# Patient Record
Sex: FEMALE | Race: WHITE | NOT HISPANIC OR LATINO | Employment: UNEMPLOYED | URBAN - METROPOLITAN AREA
[De-identification: names, ages, dates, MRNs, and addresses within clinical notes are randomized per-mention and may not be internally consistent; named-entity substitution may affect disease eponyms.]

---

## 2017-11-13 ENCOUNTER — HOSPITAL ENCOUNTER (OUTPATIENT)
Facility: HOSPITAL | Age: 22
Discharge: HOME | End: 2017-11-13
Attending: OBSTETRICS & GYNECOLOGY | Admitting: OBSTETRICS & GYNECOLOGY
Payer: COMMERCIAL

## 2017-11-13 VITALS
WEIGHT: 175 LBS | HEART RATE: 100 BPM | SYSTOLIC BLOOD PRESSURE: 129 MMHG | OXYGEN SATURATION: 100 % | RESPIRATION RATE: 20 BRPM | HEIGHT: 66 IN | DIASTOLIC BLOOD PRESSURE: 77 MMHG | TEMPERATURE: 98.1 F | BODY MASS INDEX: 28.12 KG/M2

## 2017-11-13 DIAGNOSIS — Z3A.39 39 WEEKS GESTATION OF PREGNANCY: ICD-10-CM

## 2017-11-13 PROCEDURE — G0463 HOSPITAL OUTPT CLINIC VISIT: HCPCS

## 2017-11-13 PROCEDURE — 99204 OFFICE O/P NEW MOD 45 MIN: CPT

## 2017-11-13 NOTE — PROGRESS NOTES
L&D Triage Note - OB/GYN  Nathalie Anderson 19 y o  female MRN: [id number]  Unit/Bed#: LD Triage  Encounter: [id number]      Assessment:  19 y o   at 31wga, not ruptured  Physiologic leukorrhea of pregnancy    Plan:  1  Signs and symptoms of labor reviewed with patient with precautions to return  Pt demonstrated understanding  2  Pt is planning on switching to Dr Astrid Fernando' OB/GYN practice  Contact information given to patient  Recommended calling first thing tomorrow to establish care  3  Discharge to home    Discussed with Dr Long Nguyễn      ______________________________________________________________________      Chief Compliant: I think I broke my water    TIME: 1530  Subjective:  19 y o  Edna Guadarrama at [address] presents to triage with concern for ruptured membranes  Pt reports that she thinks she lost her mucous plug on Friday  She then sneezed at work today and felt wet  She reports that the fluid was green, and is now looking clear  The leaking is not continuous  Endorses good fetal movement  Denies VB, contractions  Pt reports this pregnancy has been uncomplicated  She has had a previous  section x1 in 2016  She is Rh negative  Objective:  Vitals:    17 1520   BP: 129/77   Pulse: 100   Resp: 20   Temp: 98 1 °F (36 7 °C)   SpO2:        SVE: Not indicated  FHT:  Reactive and reassuring  [place]: Quiet  SSE: Copious amounts of thick whitish discharge apparent within the vaginal vault  Cervix is visually closed  No blood appreciated  Negative pooling, ferning, nitrazine  [place]/KOH: Negative for clue cells, trichomonads, hyphae  TAUS: Q1 2 40, Q2 3 09, Q3 0 82, Q4 2 33, total ELYSE 8 64  Good fetal movement appreciated   Vertex presentation          Jacqueline Starr DO 2017 3:51 PM

## 2017-11-20 ENCOUNTER — ANESTHESIA EVENT (INPATIENT)
Dept: LABOR AND DELIVERY | Facility: HOSPITAL | Age: 22
End: 2017-11-20
Payer: COMMERCIAL

## 2017-11-20 ENCOUNTER — HOSPITAL ENCOUNTER (INPATIENT)
Facility: HOSPITAL | Age: 22
LOS: 2 days | Discharge: HOME | End: 2017-11-22
Attending: OBSTETRICS & GYNECOLOGY | Admitting: OBSTETRICS & GYNECOLOGY
Payer: COMMERCIAL

## 2017-11-20 ENCOUNTER — ANESTHESIA (INPATIENT)
Dept: LABOR AND DELIVERY | Facility: HOSPITAL | Age: 22
End: 2017-11-20
Payer: COMMERCIAL

## 2017-11-20 DIAGNOSIS — Z3A.32 32 WEEKS GESTATION OF PREGNANCY: Primary | ICD-10-CM

## 2017-11-20 DIAGNOSIS — O60.00 PRETERM LABOR: ICD-10-CM

## 2017-11-20 DIAGNOSIS — Z98.891 HISTORY OF CESAREAN SECTION: ICD-10-CM

## 2017-11-20 DIAGNOSIS — Z67.91 RH NEGATIVE STATUS DURING PREGNANCY: ICD-10-CM

## 2017-11-20 DIAGNOSIS — O09.30 INSUFFICIENT PRENATAL CARE: ICD-10-CM

## 2017-11-20 DIAGNOSIS — O26.899 RH NEGATIVE STATUS DURING PREGNANCY: ICD-10-CM

## 2017-11-20 LAB
ABO GROUP BLD: NORMAL
AMPHETAMINES SERPL QL SCN: NEGATIVE
BACTERIA UR QL AUTO: ABNORMAL /HPF
BARBITURATES UR QL: NEGATIVE
BASOPHILS # BLD AUTO: 0.01 THOUSANDS/ΜL (ref 0–0.1)
BASOPHILS NFR BLD AUTO: 0 % (ref 0–1)
BENZODIAZ UR QL: NEGATIVE
BILIRUB UR QL STRIP: ABNORMAL
BLD GP AB SCN SERPL QL: NEGATIVE
CLARITY UR: ABNORMAL
COCAINE UR QL: NEGATIVE
COLOR UR: ABNORMAL
EOSINOPHIL # BLD AUTO: 0.02 THOUSAND/ΜL (ref 0–0.61)
EOSINOPHIL NFR BLD AUTO: 0 % (ref 0–6)
ERYTHROCYTE [DISTWIDTH] IN BLOOD BY AUTOMATED COUNT: 14.5 % (ref 11.6–15.1)
GLUCOSE UR STRIP-MCNC: NEGATIVE MG/DL
HCT VFR BLD AUTO: 32.8 % (ref 34.8–46.1)
HGB BLD-MCNC: 10.6 G/DL (ref 11.5–15.4)
HGB UR QL STRIP.AUTO: ABNORMAL
HIV 1+2 AB+HIV1 P24 AG SERPL QL IA: NORMAL
HIV1 P24 AG SER QL: NORMAL
KETONES UR STRIP-MCNC: ABNORMAL MG/DL
LEUKOCYTE ESTERASE UR QL STRIP: ABNORMAL
LYMPHOCYTES # BLD AUTO: 1.48 THOUSANDS/ΜL (ref 0.6–4.47)
LYMPHOCYTES NFR BLD AUTO: 8 % (ref 14–44)
MCH RBC QN AUTO: 26.1 PG (ref 26.8–34.3)
MCHC RBC AUTO-ENTMCNC: 32.3 G/DL (ref 31.4–37.4)
MCV RBC AUTO: 81 FL (ref 82–98)
METHADONE UR QL: NEGATIVE
MONOCYTES # BLD AUTO: 1.04 THOUSAND/ΜL (ref 0.17–1.22)
MONOCYTES NFR BLD AUTO: 6 % (ref 4–12)
NEUTROPHILS # BLD AUTO: 16.32 THOUSANDS/ΜL (ref 1.85–7.62)
NEUTS SEG NFR BLD AUTO: 86 % (ref 43–75)
NITRITE UR QL STRIP: POSITIVE
NON-SQ EPI CELLS URNS QL MICRO: ABNORMAL /HPF
NRBC BLD AUTO-RTO: 0 /100 WBCS
OPIATES UR QL SCN: NEGATIVE
PCP UR QL: NEGATIVE
PH UR STRIP.AUTO: 7 [PH] (ref 4.5–8)
PLATELET # BLD AUTO: 268 THOUSANDS/UL (ref 149–390)
PMV BLD AUTO: 9.8 FL (ref 8.9–12.7)
PROT UR STRIP-MCNC: ABNORMAL MG/DL
RBC # BLD AUTO: 4.06 MILLION/UL (ref 3.81–5.12)
RBC #/AREA URNS AUTO: ABNORMAL /HPF
RH BLD: NEGATIVE
SP GR UR STRIP.AUTO: 1.02 (ref 1–1.03)
SPECIMEN EXPIRATION DATE: NORMAL
THC UR QL: NEGATIVE
UROBILINOGEN UR QL STRIP.AUTO: 1 E.U./DL
WBC # BLD AUTO: 18.95 THOUSAND/UL (ref 4.31–10.16)
WBC #/AREA URNS AUTO: ABNORMAL /HPF

## 2017-11-20 PROCEDURE — 80307 DRUG TEST PRSMV CHEM ANLYZR: CPT | Performed by: OBSTETRICS & GYNECOLOGY

## 2017-11-20 PROCEDURE — 87653 STREP B DNA AMP PROBE: CPT | Performed by: OBSTETRICS & GYNECOLOGY

## 2017-11-20 PROCEDURE — 87806 HIV AG W/HIV1&2 ANTB W/OPTIC: CPT | Performed by: OBSTETRICS & GYNECOLOGY

## 2017-11-20 PROCEDURE — 99213 OFFICE O/P EST LOW 20 MIN: CPT

## 2017-11-20 PROCEDURE — 80081 OBSTETRIC PANEL INC HIV TSTG: CPT | Performed by: OBSTETRICS & GYNECOLOGY

## 2017-11-20 PROCEDURE — 81001 URINALYSIS AUTO W/SCOPE: CPT | Performed by: OBSTETRICS & GYNECOLOGY

## 2017-11-20 PROCEDURE — 87086 URINE CULTURE/COLONY COUNT: CPT | Performed by: OBSTETRICS & GYNECOLOGY

## 2017-11-20 PROCEDURE — 87491 CHLMYD TRACH DNA AMP PROBE: CPT | Performed by: OBSTETRICS & GYNECOLOGY

## 2017-11-20 PROCEDURE — G0463 HOSPITAL OUTPT CLINIC VISIT: HCPCS

## 2017-11-20 PROCEDURE — 87591 N.GONORRHOEAE DNA AMP PROB: CPT | Performed by: OBSTETRICS & GYNECOLOGY

## 2017-11-20 RX ORDER — SODIUM CHLORIDE, SODIUM LACTATE, POTASSIUM CHLORIDE, CALCIUM CHLORIDE 600; 310; 30; 20 MG/100ML; MG/100ML; MG/100ML; MG/100ML
125 INJECTION, SOLUTION INTRAVENOUS CONTINUOUS
Status: DISCONTINUED | OUTPATIENT
Start: 2017-11-20 | End: 2017-11-22 | Stop reason: HOSPADM

## 2017-11-20 RX ORDER — BETAMETHASONE SODIUM PHOSPHATE AND BETAMETHASONE ACETATE 3; 3 MG/ML; MG/ML
12 INJECTION, SUSPENSION INTRA-ARTICULAR; INTRALESIONAL; INTRAMUSCULAR; SOFT TISSUE EVERY 24 HOURS
Status: DISCONTINUED | OUTPATIENT
Start: 2017-11-20 | End: 2017-11-21

## 2017-11-20 RX ORDER — NIFEDIPINE 10 MG/1
30 CAPSULE ORAL ONCE
Status: COMPLETED | OUTPATIENT
Start: 2017-11-20 | End: 2017-11-20

## 2017-11-20 RX ORDER — ACETAMINOPHEN 325 MG/1
650 TABLET ORAL EVERY 4 HOURS PRN
Status: DISCONTINUED | OUTPATIENT
Start: 2017-11-20 | End: 2017-11-21

## 2017-11-20 RX ORDER — MAGNESIUM SULFATE IN WATER 40 MG/ML
6 INJECTION, SOLUTION INTRAVENOUS ONCE
Status: COMPLETED | OUTPATIENT
Start: 2017-11-20 | End: 2017-11-20

## 2017-11-20 RX ORDER — OXYTOCIN/RINGER'S LACTATE 30/500 ML
PLASTIC BAG, INJECTION (ML) INTRAVENOUS
Status: COMPLETED
Start: 2017-11-20 | End: 2017-11-21

## 2017-11-20 RX ORDER — ROPIVACAINE HYDROCHLORIDE 2 MG/ML
INJECTION, SOLUTION EPIDURAL; INFILTRATION; PERINEURAL AS NEEDED
Status: DISCONTINUED | OUTPATIENT
Start: 2017-11-20 | End: 2017-11-21 | Stop reason: SURG

## 2017-11-20 RX ORDER — NIFEDIPINE 10 MG/1
10 CAPSULE ORAL EVERY 6 HOURS
Status: DISCONTINUED | OUTPATIENT
Start: 2017-11-21 | End: 2017-11-21

## 2017-11-20 RX ADMIN — NIFEDIPINE 30 MG: 10 CAPSULE ORAL at 19:43

## 2017-11-20 RX ADMIN — Medication 6 G: at 19:50

## 2017-11-20 RX ADMIN — ROPIVACAINE HYDROCHLORIDE: 2 INJECTION, SOLUTION EPIDURAL; INFILTRATION at 21:10

## 2017-11-20 RX ADMIN — ROPIVACAINE HYDROCHLORIDE 6 MG: 2 INJECTION, SOLUTION EPIDURAL; INFILTRATION at 21:06

## 2017-11-20 RX ADMIN — SODIUM CHLORIDE, POTASSIUM CHLORIDE, SODIUM LACTATE AND CALCIUM CHLORIDE 125 ML/HR: 600; 310; 30; 20 INJECTION, SOLUTION INTRAVENOUS at 22:48

## 2017-11-20 RX ADMIN — SODIUM CHLORIDE, POTASSIUM CHLORIDE, SODIUM LACTATE AND CALCIUM CHLORIDE 125 ML/HR: 600; 310; 30; 20 INJECTION, SOLUTION INTRAVENOUS at 20:49

## 2017-11-20 RX ADMIN — MAGNESIUM SULFATE HEPTAHYDRATE 2 G/HR: 500 INJECTION, SOLUTION INTRAMUSCULAR; INTRAVENOUS at 21:25

## 2017-11-20 RX ADMIN — BETAMETHASONE SODIUM PHOSPHATE AND BETAMETHASONE ACETATE 12 MG: 3; 3 INJECTION, SUSPENSION INTRA-ARTICULAR; INTRALESIONAL; INTRAMUSCULAR at 19:52

## 2017-11-20 RX ADMIN — SODIUM CHLORIDE 5 MILLION UNITS: 0.9 INJECTION, SOLUTION INTRAVENOUS at 19:44

## 2017-11-20 RX ADMIN — SODIUM CHLORIDE, POTASSIUM CHLORIDE, SODIUM LACTATE AND CALCIUM CHLORIDE 125 ML/HR: 600; 310; 30; 20 INJECTION, SOLUTION INTRAVENOUS at 19:30

## 2017-11-20 RX ADMIN — SODIUM CHLORIDE 2.5 MILLION UNITS: 9 INJECTION, SOLUTION INTRAVENOUS at 23:23

## 2017-11-21 LAB
BACTERIA UR CULT: NORMAL
BASE EXCESS BLDCOA CALC-SCNC: -5.8 MMOL/L (ref 3–11)
BASE EXCESS BLDCOV CALC-SCNC: -4.8 MMOL/L (ref 1–9)
EXTERNAL GROUP B STREP ANTIGEN: NORMAL
HBV SURFACE AG SER QL: NORMAL
HCO3 BLDCOA-SCNC: 21.7 MMOL/L (ref 17.3–27.3)
HCO3 BLDCOV-SCNC: 19.5 MMOL/L (ref 12.2–28.6)
O2 CT VFR BLDCOA CALC: 7.2 ML/DL
OXYHGB MFR BLDCOA: 31.5 %
OXYHGB MFR BLDCOV: 60.3 %
PCO2 BLDCOA: 49.8 MM[HG] (ref 30–60)
PCO2 BLDCOV: 34.4 MM HG (ref 27–43)
PH BLDCOA: 7.26 [PH] (ref 7.23–7.43)
PH BLDCOV: 7.37 [PH] (ref 7.19–7.49)
PO2 BLDCOA: 18.3 MM HG (ref 5–25)
PO2 BLDCOV: 27.2 MM HG (ref 15–45)
RPR SER QL: NORMAL
RUBV IGG SERPL IA-ACNC: 121.6 IU/ML
SAO2 % BLDCOV: 13.8 ML/DL

## 2017-11-21 PROCEDURE — 0UQGXZZ REPAIR VAGINA, EXTERNAL APPROACH: ICD-10-PCS | Performed by: OBSTETRICS & GYNECOLOGY

## 2017-11-21 PROCEDURE — 82805 BLOOD GASES W/O2 SATURATION: CPT | Performed by: OBSTETRICS & GYNECOLOGY

## 2017-11-21 PROCEDURE — 88307 TISSUE EXAM BY PATHOLOGIST: CPT | Performed by: OBSTETRICS & GYNECOLOGY

## 2017-11-21 PROCEDURE — 10907ZC DRAINAGE OF AMNIOTIC FLUID, THERAPEUTIC FROM PRODUCTS OF CONCEPTION, VIA NATURAL OR ARTIFICIAL OPENING: ICD-10-PCS | Performed by: OBSTETRICS & GYNECOLOGY

## 2017-11-21 RX ORDER — OXYTOCIN/RINGER'S LACTATE 30/500 ML
250 PLASTIC BAG, INJECTION (ML) INTRAVENOUS CONTINUOUS
Status: DISCONTINUED | OUTPATIENT
Start: 2017-11-21 | End: 2017-11-22 | Stop reason: HOSPADM

## 2017-11-21 RX ORDER — IBUPROFEN 600 MG/1
600 TABLET ORAL EVERY 6 HOURS PRN
Status: DISCONTINUED | OUTPATIENT
Start: 2017-11-21 | End: 2017-11-22 | Stop reason: HOSPADM

## 2017-11-21 RX ORDER — OXYCODONE HYDROCHLORIDE AND ACETAMINOPHEN 5; 325 MG/1; MG/1
1 TABLET ORAL EVERY 4 HOURS PRN
Status: DISCONTINUED | OUTPATIENT
Start: 2017-11-21 | End: 2017-11-22 | Stop reason: HOSPADM

## 2017-11-21 RX ORDER — DOCUSATE SODIUM 100 MG/1
100 CAPSULE, LIQUID FILLED ORAL 2 TIMES DAILY
Status: DISCONTINUED | OUTPATIENT
Start: 2017-11-21 | End: 2017-11-22 | Stop reason: HOSPADM

## 2017-11-21 RX ORDER — ACETAMINOPHEN 325 MG/1
650 TABLET ORAL EVERY 6 HOURS PRN
Status: DISCONTINUED | OUTPATIENT
Start: 2017-11-21 | End: 2017-11-22 | Stop reason: HOSPADM

## 2017-11-21 RX ORDER — CALCIUM CARBONATE 200(500)MG
1000 TABLET,CHEWABLE ORAL DAILY PRN
Status: DISCONTINUED | OUTPATIENT
Start: 2017-11-21 | End: 2017-11-22 | Stop reason: HOSPADM

## 2017-11-21 RX ORDER — DIPHENHYDRAMINE HCL 25 MG
25 TABLET ORAL EVERY 6 HOURS PRN
Status: DISCONTINUED | OUTPATIENT
Start: 2017-11-21 | End: 2017-11-22 | Stop reason: HOSPADM

## 2017-11-21 RX ORDER — ONDANSETRON 2 MG/ML
4 INJECTION INTRAMUSCULAR; INTRAVENOUS EVERY 8 HOURS PRN
Status: DISCONTINUED | OUTPATIENT
Start: 2017-11-21 | End: 2017-11-22 | Stop reason: HOSPADM

## 2017-11-21 RX ORDER — DIAPER,BRIEF,INFANT-TODD,DISP
1 EACH MISCELLANEOUS AS NEEDED
Status: DISCONTINUED | OUTPATIENT
Start: 2017-11-21 | End: 2017-11-22 | Stop reason: HOSPADM

## 2017-11-21 RX ORDER — OXYCODONE HYDROCHLORIDE AND ACETAMINOPHEN 5; 325 MG/1; MG/1
2 TABLET ORAL EVERY 4 HOURS PRN
Status: DISCONTINUED | OUTPATIENT
Start: 2017-11-21 | End: 2017-11-22 | Stop reason: HOSPADM

## 2017-11-21 RX ADMIN — IBUPROFEN 600 MG: 600 TABLET ORAL at 05:26

## 2017-11-21 RX ADMIN — BENZOCAINE AND MENTHOL: 20; .5 SPRAY TOPICAL at 05:27

## 2017-11-21 RX ADMIN — Medication 250 MILLI-UNITS/MIN: at 02:20

## 2017-11-21 RX ADMIN — DOCUSATE SODIUM 100 MG: 100 CAPSULE, LIQUID FILLED ORAL at 17:08

## 2017-11-21 RX ADMIN — DOCUSATE SODIUM 100 MG: 100 CAPSULE, LIQUID FILLED ORAL at 10:49

## 2017-11-21 RX ADMIN — IBUPROFEN 600 MG: 600 TABLET ORAL at 11:25

## 2017-11-21 RX ADMIN — HYDROCORTISONE 1 APPLICATION: 1 CREAM TOPICAL at 05:27

## 2017-11-21 RX ADMIN — ACETAMINOPHEN 650 MG: 325 TABLET, FILM COATED ORAL at 02:39

## 2017-11-21 RX ADMIN — WITCH HAZEL 1 PAD: 500 SOLUTION RECTAL; TOPICAL at 05:27

## 2017-11-21 RX ADMIN — IBUPROFEN 600 MG: 600 TABLET ORAL at 17:36

## 2017-11-21 RX ADMIN — Medication 1 TABLET: at 10:49

## 2017-11-21 NOTE — CONSULTS
MATERNAL CONSULTATION - NICU   Antoinette Holguin 19 y o  female MRN: [id number]  Unit/Bed#:  302-01 Encounter: [id number]    History of Present Illness     Inpatient consult to Neonatology  Consult performed by: Zahira Brandon  Consult ordered by: Nicolle Brown          HPI: Antoinette Holguin is a 19y o  year old Yves Benzilder female at [place] [address] with an CONNOR of 2018, by patients verbal report of USG performed at [facility]  who presents with  labor  She has the following prenatal labs: all prenatal labs pending    Pregnancy complications:  labor, short interval pregnancy, poor prenatal care  Fetal Complications: none  Maternal medical history and medications: none    Maternal social history: denies drug or alcohol use  Marital status: Single  Maternal  medications:  steroids: betamethsone x 1 dose on   Tocolytics:  Magnesium  Other medications: pnecillin x 1 dose on     Historical Information   Past Medical History:   Diagnosis Date    Anxiety     Depression     Varicella     vaccine      Past Surgical History:   Procedure Laterality Date     SECTION N/A 2016    Procedure:  SECTION ();   Surgeon: Lianne Guo MD;  Location: [facility];  Service:      History   Smoking Status    Former Smoker   Smokeless Tobacco    Never Used     OB History:   #: 1, Date: 16, Sex: Female, Weight: 2967 g (6 lb 8 7 oz), GA: 39w2d, Delivery: , Low Transverse, Apgar1: 9, Apgar5: 9, Living: Living, Birth Comments: previous weight entered in error    #: 2, Date: None, Sex: None, Weight: None, GA: None, Delivery: None, Apgar1: None, Apgar5: None, Living: None, Birth Comments: None    Family History: non-contributory    Meds/Allergies   all current active meds have been reviewed    Allergies   Allergen Reactions    Latex Rash       Objective   No intake or output data in the 24 hours ending 17 2300  Invasive Devices Peripheral Intravenous Line            Peripheral IV 11/20/17 Left Wrist less than 1 day          Epidural Line            Epidural Catheter 11/20/17 less than 1 day          Drain            Urethral Catheter Double-lumen;Non-latex 16 Fr  less than 1 day                Lab Results: I have personally reviewed pertinent reports  Imaging Studies: I have personally reviewed pertinent reports  EKG, Pathology, and Other Studies: I have personally reviewed pertinent reports  Code Status: Level 1 - Full Code    Counseling / Coordination of Care  Total floor / unit time spent today 40 minutes  Greater than 50% of total time was spent with the patient and / or family counseling and / or coordination of care  A description of the counseling / coordination of care: I had the pleasure of talking to [facility] about what to expect at delivery of her anticipated 26 weeks male baby  [place] labor is complicated by the fact that she had poor prenatal care which makes her dating unreliable  By OB exam and USG done today she appears to be 38 weeks  How ever , she verbally reports of an USG done at [facility] one month ago ( which was her only prenatal visit this pregnancy) which put her [address] at 01/13/18  I reassured her that a team from NICU will be present at delivery and examine the infant to determine gestational age  If her infant is premature at 32 weeks likely complications  include possible respiratory distress that he might need CPAP to provide enough FRC  Other issues include feeding immaturity that he might need gavage feeding; temperature instability that he will be in the isolette for thermoregulation; hyperbilirubinemia of prematurity with possible phototherapy, apnea of prematurity, that he might need caffeine, if severe  She plans on providing breast milk  She is aware that baby will be discharged from NICU  when he is on full PO feeding, in open crib and of course on RA   If the infant appears closer to term and appears otherwise stable we might be able to leave the infant in NBN with her for normal NB care  All her questions were answered and she understood everything            Assessment/Plan   Principal Problem:    32 weeks gestation of pregnancy  Active Problems:    History of  section    Rh negative status during pregnancy    Insufficient prenatal care    Assessment:    labor with anticipated delivery    Plan:  Await spontaneous labor

## 2017-11-21 NOTE — ANESTHESIA POSTPROCEDURE EVALUATION
Post-Op Assessment Note      CV Status:  Stable    Mental Status:  Alert and awake    Hydration Status:  Euvolemic    PONV Controlled:  None    Airway Patency:  Patent    Post Op Vitals Reviewed: Yes          Staff: Anesthesiologist     Post-op block assessment: catheter intact and no complications        BP      Temp      Pulse     Resp      SpO2

## 2017-11-21 NOTE — L&D DELIVERY NOTE
Delivery Summary - OB/GYN   Zara Morin 19 y o  female MRN: [id number]  Unit/Bed#: -01 Encounter: [id number]    Pre-delivery Diagnosis:   1  32w3d pregnancy by patient reported CONNOR   2   labor  3  History of prior  section x1  4  Insufficient prenatal care  5  GBS unknown    Post-delivery Diagnosis: same, delivered  1  Likely term gestation based on post-partum exam, delivered  2  Labor  3  History of prior  section x1  4  Insufficient prenatal care  5  GBS unknown    Attending: Danielle    Assistant(s): Daniele Bustos    Procedure:     Anesthesia: epidural    Estimated Blood Loss:  500 mL    Specimens:   1  Arterial and venous cord gases  2  Cord blood  3  Segment of umbilical cord  4  Placenta to pathology     Complications:  None apparent    Findings:  1  Viable, term female  delivered on 17 at 0213 weighing 7lbs 12oz; Apgar scores of 8 at one minute and 9 at five minutes  Umbilical artery pH 0 910 (base excess -5 8)  2  Nuchal cord x1, easily reduced  3  Spontaneous delivery of placenta with centrally inserted 3-vessel cord  4  Vaginal laceration repaired with 3-0 Vicryl rapide    Disposition: Patient tolerated the procedure well and was recovering in labor and delivery room with family and  before being transferred to the post-partum floor  Procedure Details     Description of procedure    After pushing for 54 minutes, on 17 at 0213 patient delivered a viable female , weighing 7lbs 12oz, Apgars of 8 (1 min) and 9 (5 min)  The fetal vertex delivered spontaneously  There was a single loop of nuchal cord which was easily reduced  The anterior shoulder delivered atraumatically with maternal expulsive forces and the assistance of gentle downward traction  The posterior shoulder delivered with maternal expulsive forces and the assistance of gentle upward traction  The remainder of the fetus delivered spontaneously       Upon delivery, the infant was placed on the mothers abdomen and the cord was clamped and cut  The infant was noted to cry spontaneously and was moving all extremities appropriately  There was no evidence for injury  Awaiting NICU staff evaluated the  in the infant warmer  Arterial and venous cord blood gases and cord blood was collected for analysis  These were promptly sent to the lab  In the immediate post-partum, 30 units of IV pitocin was administered and the uterus was noted to contract down well with massage and pitocin  The placenta delivered spontaneously at [address] and was noted to have a centrally inserted 3 vessel cord  The vagina, cervix, and perineum were inspected and there was noted to be a vaginal laceration requiring repair  Laceration Repair  Patient was comfortable with epidural at that time  A vaginal laceration was identified and was noted to be steadily bleeding  Vaginal laceration was repaired with 3-0 Vicryl rapide in running, locking fashion  Bleeding persisted and the apex of the laceration was appreciated deeper within the vagina  A Shakir retractor was placed and laceration was repaired in running, locking fashion with 3-0 Vicryl rapide  Slow bleeding was appreciated at the level of the hymen  This area was inspected and secondary to manipulation to visualize the apex of the vaginal laceration, a small area of suture was noted to have pulled through  The original suture was cut and removed  Lower portion of the vaginal laceration was repaired with 3-0 Vicryl rapide in running, locking fashion  The length of the laceration was palpated and noted to be intact  Good hemostasis was confirmed at the conclusion of this procedure  At the conclusion of the delivery, all needle, sponge, and instrument counts were noted to be correct   Patient tolerated the procedure well and was allowed to recover in labor and delivery room with family and  before being transferred to the post-partum floor  Dr Jerod Short was present and participated in all key portions of the case

## 2017-11-21 NOTE — OB LABOR/OXYTOCIN SAFETY PROGRESS
Labor Progress Note - Emma Joe 19 y o  female MRN: [id number]    Unit/Bed#: -01 Encounter: [id number]    Obstetric History       T1      L1     SAB0   TAB0   Ectopic0   Multiple0   Live Births1      Gestational Age: 33w3d     Contraction Frequency (minutes): 3-6  Contraction Quality: Strong      Dilation: 10  Dilation Complete Date: 17  Dilation Complete Time: 6  Effacement (%): 100  Station: 0  Baseline Rate: 150 bpm  Fetal Heart Rate: 150 BPM             Notes/comments:   Patient electing for TOLAC  Progressing well and complete dilation appreciated  Deep variable deceleration and prolonged deceleration to 130's for 6min (baseline 150's)  Intermittent variable decelerations had been appreciated prior to now  AROM performed to expedite delivery, given complete dilation  FHT spontaneously recovered to 150s with moderate variability and accelerations  Will observe and allow to descend if FHT remain overall reassuring  Can begin pushing if concerns regarding FHT            Livan Fernando MD 2017 10:41 PM

## 2017-11-21 NOTE — PLAN OF CARE
Knowledge Deficit     Verbalizes understanding of labor plan Completed        Labor & Delivery     Manages discomfort Completed     Patient vital signs are stable Completed          DISCHARGE PLANNING     Discharge to home or other facility with appropriate resources Progressing        INFECTION - ADULT     Absence or prevention of progression during hospitalization Progressing     Absence of fever/infection during neutropenic period Progressing        Knowledge Deficit     Patient/family/caregiver demonstrates understanding of disease process, treatment plan, medications, and discharge instructions Progressing        PAIN - ADULT     Verbalizes/displays adequate comfort level or baseline comfort level Progressing        POSTPARTUM     Experiences normal postpartum course Progressing     Appropriate maternal -  bonding Progressing     Establishment of infant feeding pattern Progressing     Incision(s), wounds(s) or drain site(s) healing without S/S of infection Progressing        SAFETY ADULT     Patient will remain free of falls Progressing     Maintain or return to baseline ADL function Progressing     Maintain or return mobility status to optimal level Progressing

## 2017-11-21 NOTE — PLAN OF CARE
DISCHARGE PLANNING     Discharge to home or other facility with appropriate resources Progressing        INFECTION - ADULT     Absence or prevention of progression during hospitalization Progressing     Absence of fever/infection during neutropenic period Progressing        Knowledge Deficit     Patient/family/caregiver demonstrates understanding of disease process, treatment plan, medications, and discharge instructions Progressing     Verbalizes understanding of labor plan Progressing        Labor & Delivery     Manages discomfort Progressing     Patient vital signs are stable Progressing        PAIN - ADULT     Verbalizes/displays adequate comfort level or baseline comfort level Progressing        SAFETY ADULT     Patient will remain free of falls Progressing     Maintain or return to baseline ADL function Progressing     Maintain or return mobility status to optimal level Progressing

## 2017-11-21 NOTE — DISCHARGE SUMMARY
Discharge Summary - Chela Keen 19 y o  female MRN: [id number]    Unit/Bed#: -01 Encounter: [id number]    Admission Date: 2017     Discharge Date: 17     Admitting Diagnosis:   1  32w3d pregnancy by patient reported CONNOR   2   labor  3  History of prior  section x1  4  Insufficient prenatal care  5  GBS unknown    Discharge Diagnosis:   1  Term gestation, delivered  2  Labor  3  History of prior  section x1  4  Insufficient prenatal care  5  GBS unknown    Procedures: vaginal birth after  ()    Attending: Heri Mendoza MD    Hospital Course:     Chela Keen is a 19 y o  [place] Otilio at 32w3d wks by patient reported CONNOR based on 2nd trimester ultrasound who was initially admitted for pre-term labor  She presented to L&D with vaginal bleeding that was moderate flow and contractions that were getting stronger and closer together  She was noted to be 6cm dilated on evaluation  She was originally a [facility] patient with very limited OB care and was attempting to establish care with [facility]  After review of Care Everywhere, there was no notable records from [facility] or a copy of an ultrasound for dating  A call placed to [facility] revealed no record of care during this pregnancy  Pt received standard of care for  labor, including BTM, magnesium, nifedipine, and penicillin for GBS ppx  Fetal biometry was difficult secondary to laboring/fetal positioning, but a femur length was notable for 38wks EGA  Pt received an epidural for anesthesia and progressed well on own  She was complete at 2236 and membranes were artificially ruptured  She labored down until 0119 when she started pushing  She delivered a viable female  on 17 at [phone]  Weight 7lbs 12oz via vaginal birth after  ()  NICU was present at delivery and reported exam consistent with term   Apgars were 8 (1 min) and 9 (5 min)   Patient tolerated the procedure well and was transferred to recovery in stable condition  Her postpartum course was uncomplicated  Her postpartum pain was well controlled with oral analgesics  On day of discharge, she was ambulating and able to reasonably perform all ADLs  She was voiding and had appropriate bowel function  Pain was well controlled  She was discharged home on postpartum day #1 without complications  Patient was instructed to follow up with her OB as an outpatient and was given appropriate warnings to call provider if she develops signs of infection or uncontrolled pain  Complications: none apparent    Condition at discharge: good     Discharge instructions/Information to patient and family:   See after visit summary for information provided to patient and family  Provisions for Follow-Up Care:  See after visit summary for information related to follow-up care and any pertinent home health orders        Disposition: Home    Planned Readmission: No

## 2017-11-21 NOTE — ANESTHESIA PREPROCEDURE EVALUATION
Review of Systems/Medical History  Patient summary reviewed  Chart reviewed      Cardiovascular  Exercise tolerance: good,     Pulmonary       GI/Hepatic            Endo/Other     GYN  Currently pregnant ,          Hematology   Musculoskeletal       Neurology   Psychology   Anxiety, Depression ,            Physical Exam    Airway    Mallampati score: II  TM Distance: >3 FB  Neck ROM: full     Dental   No notable dental hx     Cardiovascular      Pulmonary      Other Findings        Anesthesia Plan  ASA Score- 2       Anesthesia Type- epidural with ASA Monitors  Additional Monitors:   Airway Plan:           Induction-       Informed Consent- Anesthetic plan and risks discussed with patient and spouse

## 2017-11-21 NOTE — ANESTHESIA PROCEDURE NOTES
Epidural Block    Patient location during procedure: OB  Start time: 11/20/2017 8:55 PM  Reason for block: at surgeon's request and primary anesthetic  Staffing  Anesthesiologist: Sunitha Part  Performed: anesthesiologist   Preanesthetic Checklist  Completed: patient identified, site marked, surgical consent, pre-op evaluation, timeout performed, IV checked, risks and benefits discussed and monitors and equipment checked  Epidural  Patient position: sitting  Prep: Betadine  Patient monitoring: frequent blood pressure checks, continuous pulse ox and heart rate  Approach: midline  Location: lumbar (1-5)  Injection technique: RONEL saline  Needle  Needle type: Tuohy   Needle gauge: 18 G  Catheter type: side hole  Catheter size: 20 G  Catheter at skin depth: 9 cm  Test dose: negative and lidocaine 1 5% with epinephrine 1-to-200,000negative aspiration for CSF, negative aspiration for heme and no paresthesia on injection  patient tolerated the procedure well with no immediate complications

## 2017-11-21 NOTE — OB LABOR/OXYTOCIN SAFETY PROGRESS
Labor Progress Note - Nathalie Anderson 19 y o  female MRN: [id number]    Unit/Bed#: -01 Encounter: [id number]    Obstetric History       T1      L1     SAB0   TAB0   Ectopic0   Multiple0   Live Births1      Gestational Age: 35w2d     Contraction Frequency (minutes): 1-6  Contraction Quality: Strong      Dilation: 10  Dilation Complete Date: 17  Dilation Complete Time: 2236  Effacement (%): 100  Station: 1  Baseline Rate: 145 bpm  Fetal Heart Rate: 155 BPM  FHR Category: Category I          Notes/comments:   Patient comfortable, not feeling pressure  Fetal descent appreciated but no urge to push  Patient requests to keep laboring down  Able to do so as long as fetal status remains reassuring  Will reassess in ~1hr or sooner as clinically indicated  Will discontinue tocolytic at present, given complete dilation               Norberto Bundy MD 2017 12:34 AM

## 2017-11-21 NOTE — OB LABOR/OXYTOCIN SAFETY PROGRESS
Labor Progress Note - Devon Blake 19 y o  female MRN: [id number]    Unit/Bed#: -01 Encounter: [id number]    Obstetric History       T1      L1     SAB0   TAB0   Ectopic0   Multiple0   Live Births1      Gestational Age: 32w2d     Contraction Frequency (minutes): 2-4  Contraction Quality: Strong      Dilation: 9        Effacement (%): 100  Station: 0  Baseline Rate: 155 bpm  Fetal Heart Rate: 150 BPM             Notes/comments:   Patient comfortable with epidural  Discussed risks/benefits of  vs RLTCS with pt again  Reviewed risks of uterine rupture with   Discussed risks of surgery with RLTCS, including but not limited to bleeding, infection, injury to bowel/bladder/vasculature, injury to   Patient and her partner discussing options      DW Dr Elayne Espinal MD 2017 9:55 PM

## 2017-11-21 NOTE — DISCHARGE INSTRUCTIONS
Vaginal Delivery   WHAT YOU SHOULD KNOW:   A vaginal delivery is the birth of your baby through your vagina (birth canal)  AFTER YOU LEAVE:   Medicines:  · NSAIDs  help decrease swelling and pain or fever  This medicine is available with or without a doctor's order  NSAIDs can cause stomach bleeding or kidney problems in certain people  If you take blood thinner medicine, always ask your healthcare provider if NSAIDs are safe for you  Always read the medicine label and follow directions  · Take your medicine as directed  Call your healthcare provider if you think your medicine is not helping or if you have side effects  Tell him if you are allergic to any medicine  Keep a list of the medicines, vitamins, and herbs you take  Include the amounts, and when and why you take them  Bring the list or the pill bottles to follow-up visits  Carry your medicine list with you in case of an emergency  Follow up with your primary healthcare provider:  Most women need to return 6 weeks after a vaginal delivery  Ask about how to care for your wounds or stitches  Write down your questions so you remember to ask them during your visits  Activity:  Rest as much as possible  Try to keep all activities short  You may be able to do some exercise soon after you have your baby  Talk with your primary healthcare provider before you start exercising  If you work outside the home, ask when you can return to your job  Kegel exercises:  Kegel exercises may help your vaginal and rectal muscles heal faster  You can do Kegel exercises by tightening and relaxing the muscles around your vagina  Kegel exercises help make the muscles stronger  Breast care:  When your milk comes in, your breasts may feel full and hard  Ask how to care for your breasts, even if you are not breastfeeding  Constipation:  Do not try to push the bowel movement out if it is too hard   High-fiber foods, extra liquids, and regular exercise can help you prevent constipation  Examples of high-fiber foods are fruit and bran  Prune juice and water are good liquids to drink  Regular exercise helps your digestive system work  You may also be told to take over-the-counter fiber and stool softener medicines  Take these items as directed  Hemorrhoids:  Pregnancy can cause severe hemorrhoids  You may have rectal pain because of the hemorrhoids  Ask how to prevent or treat hemorrhoids  Perineum care: Your perineum is the area between your vagina and anus  Keep the area clean and dry to help it heal and to prevent infection  Wash the area gently with soap and water when you bathe or shower  Rinse your perineum with warm water when you use the toilet  Your primary healthcare provider may suggest you use a warm sitz bath to help decrease pain  A sitz bath is a bathtub or basin filled to hip level  Stay in the sitz bath for 20 to 30 minutes, or as directed  Vaginal discharge: You will have vaginal discharge, called lochia, after your delivery  The lochia is bright red the first day or two after the birth  By the fourth day, the amount decreases, and it turns red-brown  Use a sanitary pad rather than a tampon to prevent a vaginal infection  It is normal to have lochia up to 8 weeks after your baby is born  Monthly periods: Your period may start again within 7 to 12 weeks after your baby is born  If you are breastfeeding, it may take longer for your period to start again  You can still get pregnant again even though you do not have your monthly period  Talk with your primary healthcare provider about a birth control method that will be good for you if you do not want to get pregnant  Mood changes: Many new mothers have some kind of mood changes after delivery  Some of these changes occur because of lack of sleep, hormone changes, and caring for a new baby  Some mood changes can be more serious, such as postpartum depression   Talk with your primary healthcare provider if you feel unable to care for yourself or your baby  Sexual activity:  You may need to avoid sex for 6 to 7 weeks after you have your baby  You may notice you have a decreased desire for sex, or sex may be painful  You may need to use a vaginal lubricant (gel) to help make sex more comfortable  Contact your primary healthcare provider if:   · You have heavy vaginal bleeding that fills 1 or more sanitary pads in 1 hour  · You have a fever  · Your pain does not go away, or gets worse  · The skin between your vagina and rectum is swollen, warm, or red  · You have swollen, hard, or painful breasts  · You feel very sad or depressed  · You feel more tired than usual      · You have questions or concerns about your condition or care  Seek care immediately or call 911 if:   · You have pus or yellow drainage coming from your vagina or wound  · You are urinating very little, or not at all  · Your arm or leg feels warm, tender, and painful  It may look swollen and red  · You feel lightheaded, have sudden and worsening chest pain, or trouble breathing  You may have more pain when you take deep breaths or cough, or you may cough up blood  © 2014 [address] is for End User's use only and may not be sold, redistributed or otherwise used for commercial purposes  All illustrations and images included in CareNotes® are the copyrighted property of [employer]  or Faustino Aguirre  The above information is an  only  It is not intended as medical advice for individual conditions or treatments  Talk to your doctor, nurse or pharmacist before following any medical regimen to see if it is safe and effective for you

## 2017-11-21 NOTE — H&P
History & Physical - OB/GYN   Sahra Woodward 19 y o  female MRN: [id number]  Unit/Bed#: -01 Encounter: [id number]    19 y o   at 33w3d by pt reported CONNOR  She is a patient of the [facility] by default (previous care at [facility] per pt)    Chief complaint:  Vaginal bleeding    HPI:  Ms Flakito Oneill is a 19 y o  [place] Arm[place] at 33w3d by pt reported CONNOR who presents with vaginal bleeding since 16:30  Patient notes she has been jerad since yesterday and it has gradually been getting worse  The bleeding started suddenly and was moderate in flow  Continues to have bleeding when she uses the restroom  Denies LOF  +FM  Patient reports she is in the process of establishing care at [facility], but has only been seen twice this pregnancy so far  She notes her care has been through [facility] and she had 1 ultrasound about 1mo ago  Her CONNOR was confirmed as 18 by this  She does not think anything was concerning with this ultrasound  Review of Care Everywhere is not notable for any available records from [facility] or a copy of this ultrasound  Call placed to [facility], who does not have any record of care during this pregnancy  Pregnancy Complications:  Patient Active Problem List   Diagnosis    History of  section    32 weeks gestation of pregnancy    Rh negative status during pregnancy    Insufficient prenatal care       PMH:  History reviewed  No pertinent past medical history  PSH:  Past Surgical History:   Procedure Laterality Date     SECTION N/A 2016    Procedure:  SECTION ();   Surgeon: Beau Mcgregor MD;  Location: [facility];  Service:        Social Hx:  Former tobacco use  Denies drugs, EtOH    OB Hx:  Obstetric History       T1      L1     SAB0   TAB0   Ectopic0   Multiple0   Live Births1       # Outcome Date GA Lbr Roberto/2nd Weight Sex Delivery Anes PTL Lv   2 Current            1 Term 16 39w2d  2967 g (6 lb 8 7 oz) F CS-LTranv EPI  NYDIA Name: VINAYAK,BABY GIRL  (MARILYN)      Apgar1:  9                Apgar5: 9          Meds:  No current facility-administered medications on file prior to encounter  Current Outpatient Prescriptions on File Prior to Encounter   Medication Sig Dispense Refill    Prenatal Vit-Iron Carbonyl-FA (PRENATAL MULTIVITAMIN) TABS Take 1 tablet by mouth daily  Allergies:  No Known Allergies    Labs:  Blood type: O-  Antibody: unknown  Group B strep: unknown  HIV: unknown  Hepatitis B: unknown  RPR: unknown  Rubella: Unknown  Varicella Unknown  1 hour Glucose: unknown    Physical Exam:  /90   Pulse (!) 122   Temp 98 2 °F (36 8 °C) (Oral)   Resp 18   Ht 5' 6" (1 676 m)   Wt 79 8 kg (176 lb)   SpO2 100%   BMI 28 41 kg/m²     HEENT: atraumatic, normocephalic  Heart: RRR, NMRG  Lungs: CTA b/l, no wrr  Abdomen: gravid, non-tender, non-distended  Extremities: non-tender, no edema    Presentation: cephalic by TAUS  FL c/w 03P3M      SVE: 6 / 80% / -2  FHT:  135 / Moderate 6 - 25 bpm / +accels, reactive  [place]: q1-4min (by sx and toco, toco limited by movement secondary to discomfort)    Membranes: intact    Assessment:   19 y o   at 32w2d in  labor  Uncertain EGA, per pt report by u/s 1mo prior she was 32wks, but measurement of FL and fundal height is closer to term  Plan:   1   labor at 32w2d by pt report  - Admit to L&D  - CBC, prenatal panel, GC, GBS  - UDS (pt aware of hospital protocol)  - BTM for fetal lung maturity  - Procardia for tocolysis for steroid benefit  - Magnesium sulfate given uncertain EGA for neuroprotection  - PCN for GBS ppx  - Neonatology consultation  - Expectant management  2  Hx c/s x1 - last  was 18mo prior, discussed with pt risk/benefits of RLTCS vs   Considering RLTCS  Will reassess and discuss and she progresses  3  Poor prenatal care  - UDS per protocol  - Case mgmt consult      Discussed with Dr Kiah Chavez

## 2017-11-22 VITALS
BODY MASS INDEX: 27.62 KG/M2 | SYSTOLIC BLOOD PRESSURE: 103 MMHG | TEMPERATURE: 98.5 F | OXYGEN SATURATION: 99 % | DIASTOLIC BLOOD PRESSURE: 62 MMHG | HEART RATE: 76 BPM | HEIGHT: 67 IN | WEIGHT: 176 LBS | RESPIRATION RATE: 18 BRPM

## 2017-11-22 PROBLEM — O09.30 INSUFFICIENT PRENATAL CARE: Status: RESOLVED | Noted: 2017-11-20 | Resolved: 2017-11-22

## 2017-11-22 PROBLEM — Z67.91 RH NEGATIVE STATUS DURING PREGNANCY: Status: RESOLVED | Noted: 2017-11-20 | Resolved: 2017-11-22

## 2017-11-22 PROBLEM — O26.899 RH NEGATIVE STATUS DURING PREGNANCY: Status: RESOLVED | Noted: 2017-11-20 | Resolved: 2017-11-22

## 2017-11-22 PROBLEM — Z3A.38 38 WEEKS GESTATION OF PREGNANCY: Status: ACTIVE | Noted: 2017-11-20

## 2017-11-22 LAB
CHLAMYDIA DNA CVX QL NAA+PROBE: NORMAL
GP B STREP DNA SPEC QL NAA+PROBE: NORMAL
HIV 1+2 AB+HIV1 P24 AG SERPL QL IA: NORMAL
N GONORRHOEA DNA GENITAL QL NAA+PROBE: NORMAL

## 2017-11-22 PROCEDURE — 90686 IIV4 VACC NO PRSV 0.5 ML IM: CPT | Performed by: OBSTETRICS & GYNECOLOGY

## 2017-11-22 PROCEDURE — 90715 TDAP VACCINE 7 YRS/> IM: CPT | Performed by: OBSTETRICS & GYNECOLOGY

## 2017-11-22 RX ORDER — ACETAMINOPHEN 325 MG/1
650 TABLET ORAL EVERY 4 HOURS PRN
Qty: 30 TABLET | Refills: 0
Start: 2017-11-22

## 2017-11-22 RX ORDER — IBUPROFEN 600 MG/1
600 TABLET ORAL EVERY 6 HOURS PRN
Qty: 30 TABLET | Refills: 0
Start: 2017-11-22

## 2017-11-22 RX ADMIN — IBUPROFEN 600 MG: 600 TABLET ORAL at 17:42

## 2017-11-22 RX ADMIN — IBUPROFEN 600 MG: 600 TABLET ORAL at 10:55

## 2017-11-22 RX ADMIN — DOCUSATE SODIUM 100 MG: 100 CAPSULE, LIQUID FILLED ORAL at 10:00

## 2017-11-22 RX ADMIN — IBUPROFEN 600 MG: 600 TABLET ORAL at 01:02

## 2017-11-22 RX ADMIN — INFLUENZA VIRUS VACCINE 0.5 ML: 15; 15; 15; 15 SUSPENSION INTRAMUSCULAR at 19:33

## 2017-11-22 RX ADMIN — DOCUSATE SODIUM 100 MG: 100 CAPSULE, LIQUID FILLED ORAL at 17:38

## 2017-11-22 RX ADMIN — Medication 1 TABLET: at 10:00

## 2017-11-22 RX ADMIN — TETANUS TOXOID, REDUCED DIPHTHERIA TOXOID AND ACELLULAR PERTUSSIS VACCINE, ADSORBED 0.5 ML: 5; 2.5; 8; 8; 2.5 SUSPENSION INTRAMUSCULAR at 10:59

## 2017-11-22 NOTE — PROGRESS NOTES
Progress Note - OB/GYN   Antoinette Holguin 19 y o  female MRN: [id number]  Unit/Bed#: -01 Encounter: [id number]    Assessment:  PP#1 s/p Spontaneous Vaginal Delivery (successful ), stable    Plan:  1  Postpartum hemorrhage:  cc secondary to laceration, VSS  2  Poor vs no prenatal care: prenatal panel pending, case management consult pending  3  Continue routine postpartum care  4  Encourage ambulation  5  Encourage breastfeeding  6  Pain control as needed  7  Contraception: interested in [address], declines immediate postpartum bridge, will continue to discuss    Disposition: stable, anticipate discharge postpartum day #2    Subjective/Objective   Chief Complaint:     PP#1 s/p Spontaneous Vaginal Delivery    Subjective:     Pain: no  Tolerating PO: yes  Voiding: yes  Flatus: yes  BM: no  Ambulating: yes  Breastfeeding: Breastfeeding  Chest pain: no  Shortness of breath: no  Leg pain: no  Lochia: minimal    Objective:     Vitals:  Vitals:    17 1140 17 1614 17 2312   BP: 116/73 109/63 112/65 100/63   Pulse: 59 71 86 63   Resp: 18 18 18 16   Temp: 97 9 °F (36 6 °C) 98 2 °F (36 8 °C) 97 8 °F (36 6 °C) 98 °F (36 7 °C)   TempSrc: Oral Oral Oral Oral   SpO2:       Weight:       Height:           Physical Exam:     AAOx3, NAD  CV, RRR  CTA b/l, no WRR  Soft, non-tender, non-distended, no rebound or guarding  Uterine fundus firm and non-tender, at the umbilicus   Negative Malu's bilaterally    Lab, Imaging and other studies: I have personally reviewed pertinent reports        Lab Results   Component Value Date    WBC 18 95 (H) 2017    HGB 10 6 (L) 2017    HCT 32 8 (L) 2017    MCV 81 (L) 2017     2017               Braydon Germain DO  17

## 2017-11-22 NOTE — SOCIAL WORK
Consult for "No PNC; mom UDS negative"  Per chart, mom and baby UDS negative  Spoke with pt ([phone]) who reports she is doing well and baby girl Sujatha Alaniz is 2nd kid for her and FOB/SO Brinda Kennedy ([phone]) who is involved and supportive  Pt reports she and FOB live together in [facility] with their 13 month old daughter Lukasz Jin  Pt reports having good support system, all baby supplies needed including breast pump, just applied for WIC, both she and FOB are employed with time off, and they have a car for transportation as needed  Ped is [facility] peds  Pt denies any mental health issues, drug use, or involvement with VNA or C&Y  Pt confirmed poor PNC and states she did not realize she was pregnant until about 20wks but later found out she was further along and thought she had more time to establish care  Pt states she had Dr Sammi Oakley for OB last year but was trying to find new provider while balancing work and   Pt states she plans to f/u with Dr Astrid Fernando for convenience  Pt denies any CM needs at this time  No other CM needs noted for d/c home today  Informed nursing and nursery of same

## 2017-11-22 NOTE — LACTATION NOTE
This note was copied from a baby's chart  Met with mother  Provided mother with Ready, Set, Baby booklet  Discussed Skin to Skin contact an benefits to mom and baby  Talked about the delay of the first bath until baby has adjusted  Spoke about the benefits of rooming in  Feeding on cue and what that means for recognizing infant's hunger  Avoidance of pacifiers for the first month discussed  Talked about exclusive breastfeeding for the first 6 months  Positioning and Lact reviewed as well as showing images of other feeding positions  Discussed the properties of a good latch in any position  Reviewed hand/manual expression  Discussed s/s that baby is getting enough milk and some s/s that breastfeeding dyad may need further help  Gave information on common concerns, what to expect the first few weeks after delivery, preparing for other caregivers, and how partners can help  Resources for support also provided  Information on hand expression given  Discussed benefits of knowing how to manually express breast including stimulating milk supply, softening nipple for latch and evacuating breast in the event of engorgement    Discussed feeding log since birth for the first week  Emphasized 8 or more (12) feedings in a 24 hour period, what to expect for the number of diapers per day of life and the progression of properties of the  stooling pattern  Discussed s/s that breastfeeding is going well after day 4 and when to get help from a pediatrician or lactation support person after day 4  Booklet included Breast Pumping Instructions, When You Go Back to Work or School, and Breastfeeding Resources for after discharge including access to the number for the [employer]  Gave mom tips on how to position infant and support of breast for support of large breasts while breastfeeding  Encouraged MOB to call for assistance, questions, and concerns about breastfeeding    Extension provided

## 2017-11-23 NOTE — PLAN OF CARE
DISCHARGE PLANNING     Discharge to home or other facility with appropriate resources Completed        INFECTION - ADULT     Absence or prevention of progression during hospitalization Completed     Absence of fever/infection during neutropenic period Completed        Knowledge Deficit     Patient/family/caregiver demonstrates understanding of disease process, treatment plan, medications, and discharge instructions Completed        PAIN - ADULT     Verbalizes/displays adequate comfort level or baseline comfort level Completed        POSTPARTUM     Experiences normal postpartum course Completed     Appropriate maternal -  bonding Completed     Establishment of infant feeding pattern Completed     Incision(s), wounds(s) or drain site(s) healing without S/S of infection Completed        SAFETY ADULT     Patient will remain free of falls Completed     Maintain or return to baseline ADL function Completed     Maintain or return mobility status to optimal level Completed

## 2017-11-24 NOTE — CASE MANAGEMENT
Notification of Maternity Inpatient Admission/Maternity Inpatient Authorization Request  This is a Notification of Maternity Inpatient Admission/Maternity Inpatient Authorization Request to our facility [facility]  Please be advised that this patient is currently in our facility under Inpatient Status  Below you will find the Birth/ Summary, Attending Physician and Facilitys information including NPI# and contact for the Utilization  assigned to the [facility] where the patient is receiving services  Please feel free to contact the Utilization Review Department with any questions  Mothers Information:  Dariela Nolan  MRN: [id number]  YOB: 1995  Admission Date: 2017  6:07 PM  Discharge Date: 2017  8:17 PM  Disposition: Home/Self Care  Admitting Diagnosis:   O80 VAGINAL DELIVERY  False labor, unspecified [O47 9]  Abnormal uterine and vaginal bleeding, unspecified [N93 9]  38 weeks gestation of pregnancy [Z3A 38]  [place] Information:  Estimated Date of Delivery: 17  Information for the patient's :  Tamar Gaxiola [[id number]]     [place] Delivery Information:  Sex: female  Delivered 2017 2:13 AM by Vaginal, Spontaneous Delivery; Gestational Age: 42w0d     Measurements:  Weight: 7 lb 12 2 oz (3520 g); Height: 20 5"    APGAR 1 minute 5 minutes 10 minutes   Totals: 8 9      OB History      Para Term  AB Living    2 2 2 0   2    SAB TAB Ectopic Multiple Live Births          0 2        Attending Physician:  MELINA Sahni    Specialty- Obstetrics and Gynecology  [facility] ID- [id number]  [address] [place] [address] [facility], [address]  Phone 1: [phone]  Fax: [phone] [facility])  [address] [place] [address]  [phone]  Tax ID: [id number]  NPI: [id number]    [address] [place] [address] [facility] in the [facility] by Faustino Aguirre for 2017  Network Utilization Review Department  Phone: [phone]; Fax [phone]  ATTENTION: The Network Utilization Review Department is now centralized for our 7 Facilities  Make a note that we have a new phone and fax numbers for our Department  Please call with any questions or concerns to [phone] and carefully follow the prompts so that you are directed to the right person  All voicemails are confidential  Fax any determinations, approvals, denials, and requests for initial or continue stay review clinical to [phone]  Due to HIGH CALL volume, it would be easier if you could please send faxed requests to expedite your requests and in part, help us provide discharge notifications faster

## 2017-11-29 ENCOUNTER — GENERIC CONVERSION - ENCOUNTER (OUTPATIENT)
Dept: OTHER | Facility: OTHER | Age: 22
End: 2017-11-29

## 2017-11-29 ENCOUNTER — ALLSCRIPTS OFFICE VISIT (OUTPATIENT)
Dept: OTHER | Facility: OTHER | Age: 22
End: 2017-11-29

## 2017-11-29 DIAGNOSIS — E04.9 NONTOXIC GOITER: ICD-10-CM

## 2017-11-29 DIAGNOSIS — R53.83 OTHER FATIGUE: ICD-10-CM

## 2018-01-10 NOTE — PROGRESS NOTES
MAR 2 2016         RE: Evan Maurice                                To: MELINA Vasquez    MR#: [id number]   : [address]: [id number]                             Fax: [phone]   (Exam #: [id number])      The LMP of this 23year old,  1, para 0 patient was OCT 20 2015,   giving her an CONNOR of 2016 and a current gestational age of 24 weeks   1 day by dates  A sonographic examination was performed on MAR 2 2016   using real time equipment  The ultrasound examination was performed using   abdominal & vaginal techniques  The patient has a BMI of 25 2  Her blood   pressure today was 105/56  MFM ultrasound 16   18w6d   16 CONNOR      Thank you very much for your kind referral of Evan Maurice to the   [facility]  in [place] on 2016 for level II ultrasound   evaluation and  assessment  Yusef is a 25-year-old primigravida   who is currently at 19-1/7 weeks gestation by an estimated due date of   2016 based upon menstrual dating  She has not had prior   ultrasound confirmation of her estimated due date  She was a late   registrant for prenatal care to your practice  Yusef has no complaints   today  She reports fetal movement and denies vaginal bleeding  We do not   have a copy of genetic screen results available for review today, if   previously done  Yusef has unremarkable past medical and surgical histories  She is   currently being treated with a course of amoxicillin for a urinary tract   infection  She takes no medication otherwise with the exception of a   prenatal vitamin on a daily basis and has no known drug allergy  She   denies tobacco, alcohol, or illicit drug use during the pregnancy  The   family medical history is negative with respect to first degree relatives   with diabetes, hypertension, or venous thromboembolism   The family genetic   history is negative with respect to genetic abnormalities, birth defects,   or mental retardation  Cardiac motion was not observed  INDICATIONS      fetal anatomical survey      Exam Types      LEVEL II   Transvaginal      RESULTS      Fetus # 1 of 1   Vertex presentation   Fetal growth appeared normal   Placenta Location = Anterior   No placenta previa   Placenta Grade = I      MEASUREMENTS (* Included In Average GA)      AC              13 3 cm        18 weeks 3 days* (43%)   BPD              4 3 cm        19 weeks 0 days* (50%)   HC              15 6 cm        18 weeks 3 days* (32%)   Femur            3 0 cm        19 weeks 1 day * (46%)      Nuchal Fold      3 9 mm      Humerus          2 8 cm        19 weeks 1 day   (51%)   Radius           2 2 cm        18 weeks 6 days   Ulna             2 6 cm        19 weeks 1 day   Tibia            2 6 cm        19 weeks 3 days  (52%)   Fibula           2 5 cm        18 weeks 3 days   Foot             2 8 cm        18 weeks 4 days      Cerebellum       2 0 cm        20 weeks 0 days   Biorbit          2 9 cm        19 weeks 0 days   CisternaMagna    4 1 mm      HC/AC           1 18   FL/AC           0 22   FL/BPD          0 69   EFW (Ac/Fl/Hc)   258 grams - 0 lbs 9 oz      THE AVERAGE GESTATIONAL AGE is 18 weeks 6 days +/- 10 days  AMNIOTIC FLUID      Largest Vertical Pocket = 4 3 cm   Amniotic Fluid: Normal      ANATOMY SUMMARY      The fetal cranium appeared normal in shape  A unilateral choroid plexus   cyst is present  The lateral ventricles were not dilated and the midline   structures were not deviated  The cerebellum and cisterna magna were   visualized and appeared normal  The nuchal fold is not abnormally   thickened, measured at 3 9 mm  The calvarium showed no evidence of defect,   scalloping of the parietal bones or abnormal shape  The cavum septum   pellucidum appears normal  The fetal face appears normal  There is no   evidence of facial clefting, hypotelorism, hypertelorism or micrognathia     A normal appearing nasal bone measuring 5 2 mm was identified in the   sagittal profile view  3-D views of the face appeared normal  Anatomy of   the fetal thorax appeared within normal limits  The fetal diaphragm   appears intact  There were no intrathoracic masses noted or evidence of   pleural/pericardial effusion  The cardiac arch views appeared normal     There was no suspicion of tricuspid regurgitation  The cardiac size and   structures appeared sonographically normal at the four chamber view, and   cardiac rhythm was regular  There is no suspicion of fetal dysrhythmia  There was no evidence of cardiomegaly, pericardial effusion or   atrial/ventricular disproportion  Two atrioventricular valves were noted   with normal inflow, confirmed with color Doppler imaging  Ventriculoarterial connections are appropriate and normal short axis of   the great vessels is demonstrated  The interventricular septum appeared to   be intact  There is no suspicion of an echogenic intracardiac focus  The   three vessel  tracheal view appears normal   The outflow tract views are   normal  The abdominal cavity appears normal  There is no evidence of fetal   bowel obstruction or abnormally echogenic bowel  Ascites is not present  The fetal stomach appears normal in size and shape  The right kidney   appears normal  There is no suspicion of pyelectasis  Renal cysts are not   present  The echogenicity of the kidney is normal  The left kidney   appears normal   There is no suspicion of pyelectasis  The echogenicity   of the kidney is normal   renal cysts are not present  The fetal bladder   appears normal in size and shape  There is no suspicion of ureterocele  The abdominal wall appears intact  A normal abdominal cord insertion is   noted  The spine was visualized from cervical to sacral region, within the   resolution of the ultrasound equipment, without evidence of a neural tube   defect  or other malformation   Active movement of the extremities was seen   and fetal body motion was also observed during this examination  There was   no evidence of long bone abnormality and the extremities were followed to   their distal extent  There was no evidence of club foot or rocker bottom   deformity  There was no evidence of abnormal hand posturing noted  The   fetal hands do not appear clenched  The genitalia appeared normal  The   placenta appears normal  There is no evidence of advanced placental   maturation, placental abruption, intervillous thrombosis, placental   infarction or multiple venous lakes  There is a 3 vessel cord with normal   insertion site  The uterine contour appears normal  There is no   suspicion of a uterine myoma  ADNEXA      The left ovary appeared normal and measured 3 2 x 3 1 x 2 1 cm with a   volume of 10 9 cc  The right ovary appeared normal and measured 3 5 x 2 1   x 2 4 cm with a volume of 9 2 cc  UTERINE ARTERIES                                  S/D   PI    RI    NOTCH       Left Uterine Artery              0 98         No       Right Uterine Artery             0 95         No      CERVICAL EVALUATION      The cervix appeared normal            Supine               Cervical Length: 4 20 cm        Other Test Results         Resp To T F  Pressure: No                    Funneling?: No              Dynamic Changes?: No      IMPRESSION      [facility] IUP   18 weeks and 6 days by this ultrasound  (CONNOR=JUL 28 2016)   Vertex presentation   Fetal growth appeared normal   Normal anatomy survey   Heart movement not seen   Anterior placenta   No placenta previa      GENERAL COMMENT      An apparently isolated, unilateral, left-sided choroid plexus cyst is   present  No fetal structural abnormality or ultrasound marker for   aneuploidy is otherwise identified on the Level II ultrasound study today     Fetal growth, amniotic fluid volume, and maternal uterine artery Doppler   study are normal   The placenta is normal in appearance  The cervix is normal in appearance by transvaginal sonography  The   cervical length is normal   Cervical debris is not present  Cervical   funneling is not present  Neither provocative nor dynamic change is   appreciated  Today's ultrasound findings and suggested follow-up were discussed in   detail with Vic Fitzpatrick  We discussed that prenatal ultrasound cannot rule out   all congenital abnormalities  Given the otherwise normal ultrasound study   today, and in light of the low a priori risk for trisomy 25 based upon her   age, the presence of an apparently isolated choroid plexus cyst most   likely represents normal anatomic variation  Invasive prenatal diagnosis   by genetic amniocentesis is not warranted  Vic Fitzpatrick will return to the   [facility]  in the early third trimester for followup [facility] ultrasound   evaluation  The face to face time, in addition to time spent discussing ultrasound   results, was 15 minutes, greater than 50% of which was spent during   counseling and coordination of care  RA Kebede S , R KRIS OCAMPO S  MELINA Wright     Maternal-Fetal Medicine   Electronically signed 03/02/16 11:02

## 2018-01-17 NOTE — PROGRESS NOTES
2016         RE: Elly Newsome                                To: MELINA Collins    MR#: [id number]   : [address]: [id number]                             Fax: [phone]   (Exam #: [id number])      The LMP of this 23year old,  G1, P*-*-*-* patient was OCT 20 2015, giving   her an CONNOR of 2016 and a current gestational age of 29 weeks 2 days   by dates  A sonographic examination was performed on 2016 using   real time equipment  The ultrasound examination was performed using   abdominal technique  The patient has a BMI of 29 0  Her blood pressure   today was 106/68  MFM ultrasound 16   18w6d   16 CONNOR      Cardiac motion was observed at 136 bpm       INDICATIONS      fetal growth   choroid plexus cyst      Exam Types      Level I      RESULTS      Fetus # 1 of 1   Vertex presentation   Placenta Location = Anterior   Placenta Grade = II      MEASUREMENTS (* Included In Average GA)      AC              29 8 cm        33 weeks 6 days* (42%)   BPD              8 2 cm        33 weeks 1 day * (26%)   HC              30 6 cm        33 weeks 5 days* (26%)   Femur            6 6 cm        33 weeks 6 days* (48%)      Cerebellum       4 4 cm        35 weeks 1 day      HC/AC           1 03   FL/AC           0 22   FL/BPD          0 81   EFW (Ac/Fl/Hc)  2303 grams - 5 lbs 1 oz                 (37%)      THE AVERAGE GESTATIONAL AGE is 33 weeks 4 days +/- 21 days        AMNIOTIC FLUID      Q1: 2 7      Q2: 7 4      Q3: 4 6      Q4: 4 9   ELYSE Total = 19 5 cm   Amniotic Fluid: Normal      ANATOMY      Head                                    Normal   Heart                                   See Details   Stomach                                 Normal   Right Kidney                            Normal   Left Kidney                             Normal   Bladder                                 Normal   Placenta                                Normal      ANATOMY DETAILS Visualized Appearing Sonographically Normal:   HEAD: (Calvarium, BPD Level, Cavum, Lateral Ventricles, Choroid Plexus,   Cerebellum, Cisterna Magna); HEART: (Four Chamber View, Proximal Left   Outflow, 3 Vessel Trachea, Interventricular Septum, Interatrial Septum,   Cardiac Axis, Cardiac Position);    STOMACH, RIGHT KIDNEY, LEFT KIDNEY,   BLADDER, PLACENTA      Not Visualized:   HEART: (Proximal Right Outflow)      ANATOMY COMMENTS      Fetal anatomy has been previously documented; no anomalies were   identified  No fetal structural abnormality is identified on the Level I   survey today  Follow up anatomy of the lateral ventricles, 4 chamber view,   diaphragm,  kidneys, stomach and bladder appear normal  Fetal interval   growth and amniotic fluid volume are normal       IMPRESSION      [facility] IUP   33 weeks and 4 days by this ultrasound  (CONNOR=2016)   Vertex presentation   Regular fetal heart rate of 136 bpm   Anterior placenta      GENERAL COMMENT         I had the pleasure of seeing Natalia Vivar  in the [facility]  today   for followup growth scan  She reports normal daily fetal movements  She   denies any vaginal bleeding, leakage of fluid, or significant contractions   or pelvic pressure  There has been no major pregnancy complications since   her last visit here in the  Center  There was a choroid plexus   cyst on the prior ultrasound but today the cyst is no longer present which   is reassuring  On today's ultrasound, the fetus was in a vertex presentation  The   amniotic fluid appeared very normal today  Fetal growth was within normal   range  The interval anatomy appeared very appropriate with no obvious   anomalies seen today  No choroid plexus cysts were seen today  We discussed the importance of initiating fetal kick counting at least   once daily  We discussed the "10 kicks in 2 hour rule"   I instructed her   to report to you immediately should criteria not be met  Kick counts   should begin at 28 weeks gestation  IMPORTANT FINDINGS ON TODAY'S ULTRASOUND: Appropriate growth, normal   fluid, and fetus in the vertex presentation         IN SUMMARY: Today's ultrasound was reassuring as the fetus is growing well   with normal amniotic fluid  The fetus was in a vertex presentation  She   should continue to do her kick counts on a daily basis  No further growth   ultrasounds appear indicated at this point, given the normalcy of today's   ultrasound  Certainly we would be happy to see her again in the future if   the clinical situation arise, I will leave that up to your clinical   discretion  Face-to-face time, in addition to time spent discussing ultrasound results   was 10 minutes, with greater than 50% of the time used for counseling and   coordination of care  A description of the counseling/coordination of care   is described above  Thank you very much for allowing us to participate in the care of your   patient  If you have any questions or concerns about today's visit please   do not hesitate to call me  Thank you very much  Teo PONCE  Maternal Fetal Medicine      RA Hunt M D     Maternal-Fetal Medicine   Electronically signed 06/16/16 16:18

## 2018-01-18 NOTE — PROGRESS NOTES
MAY 4 2016         RE: Baldemar Mitchell                                To: MELINA Anderson    MR#: [id number]   : DEC 21 1995   ENC:                                              Fax: [phone]   (Exam #: [id number])      The LMP of this 23year old,  G1, P*-*-*-* patient was OCT 20 2015, giving   her an CONNOR of 2016 and a current gestational age of 35 weeks 1 day   by dates  A sonographic examination was performed on MAY 4 2016 using real   time equipment  The ultrasound examination was performed using abdominal &   vaginal techniques  The patient has a BMI of 31 0  Her blood pressure   today was 108/62  MFM ultrasound 16   18w6d   16 CONNOR      Cardiac motion was observed at 164 bpm       INDICATIONS      fetal growth   choroid plexus cyst      Exam Types      Level I      RESULTS      Fetus # 1 of 1   Vertex presentation   Fetal growth appeared normal   Placenta Location = Anterior   No placenta previa   Placenta Grade = I      MEASUREMENTS (* Included In Average GA)      AC              23 0 cm        27 weeks 2 days* (33%)   BPD              7 0 cm        28 weeks 0 days* (39%)   HC              25 7 cm        27 weeks 5 days* (29%)   Femur            5 4 cm        28 weeks 6 days* (52%)      Cerebellum       3 4 cm        30 weeks 0 days      HC/AC           1 12   FL/AC           0 24   FL/BPD          0 78   EFW (Ac/Fl/Hc)  1145 grams - 2 lbs 8 oz                 (46%)      THE AVERAGE GESTATIONAL AGE is 28 weeks 0 days +/- 14 days  AMNIOTIC FLUID      Q1: 5 2      Q2: 4 2      Q3: 3 9      Q4: 3 1   ELYSE Total = 16 4 cm   Amniotic Fluid: Normal      ANATOMY COMMENTS      Fetal anatomy has been previously documented; no anomalies were   identified  No fetal structural abnormality is identified on the Level I   survey today   Follow up anatomy of the lateral ventricles, 4 chamber view,   outflow tracts, diaphragm,  kidneys, stomach and bladder appear normal    Fetal interval growth and amniotic fluid volume are normal       IMPRESSION      [facility] IUP   28 weeks and 0 days by this ultrasound  (CONNOR=2016)   Vertex presentation   Fetal growth appeared normal   Regular fetal heart rate of 164 bpm   Anterior placenta   No placenta previa      GENERAL COMMENT         I had the pleasure of seeing  Veronica Wakefield in the [facility]  today   for followup growth scan  She reports normal daily fetal movements  She   denies any vaginal bleeding, leakage of fluid, or significant contractions   or pelvic pressure  There has been no major pregnancy complications since   her last visit here in the  Center  There was evidence of a   choroid plexus cyst on her prior ultrasound but today there is no longer a   choroid plexus cyst seen  On today's ultrasound, the fetus was in a vertex presentation  The   amniotic fluid appeared very normal today  Fetal growth was within normal   range  The interval anatomy seen today showed no obvious anomalies  We discussed the importance of initiating fetal kick counting at least   once daily  We discussed the "10 kicks in 2 hour rule"  I instructed her   to report to you immediately should criteria not be met  Kick counts   should begin at 28 weeks gestation  IMPORTANT  FINDINGS ON TODAY'S ULTRASOUND: Appropriately growing fetus   with normal fluid         IN SUMMARY:  Today's ultrasound was reassuring as the fetus is growing   well with normal amniotic fluid  The fetus was in a vertex presentation  She should continue to do her kick counts on a daily basis  A fetal growth   evaluation is recommended in 6 weeks and this was scheduled today  Face-to-face time, in addition to time spent discussing ultrasound results   was 10 minutes, with greater than 50% of the time used for counseling and   coordination of care  A description of the counseling/coordination of care   is described above        Thank you very much for allowing us to participate in the care of your   patient  If you have any questions or concerns about today's visit please   do not hesitate to call me  Thank you very much  Raheel PONCE  Maternal Fetal Medicine      [address] [place] [address],[address],  MELINA Altamirano     Maternal-Fetal Medicine   Electronically signed 05/04/16 16:19

## 2018-01-22 VITALS
RESPIRATION RATE: 16 BRPM | HEART RATE: 84 BPM | WEIGHT: 153 LBS | HEIGHT: 65 IN | DIASTOLIC BLOOD PRESSURE: 70 MMHG | SYSTOLIC BLOOD PRESSURE: 118 MMHG | BODY MASS INDEX: 25.49 KG/M2

## 2018-01-23 NOTE — MISCELLANEOUS
Assessment    1  Fatigue (780 79) (R53 83)   2  Thyroid enlargement (240 9) (E04 9)    Plan  Fatigue, Thyroid enlargement    · (1) CBC/PLT/DIFF; Status:Active; Requested Cecily Stroud;    Perform:[facility]; [phone]; Ordered; For:Fatigue, Thyroid enlargement; Ordered By:Socorro Hernández;   · (1) COMPREHENSIVE METABOLIC PANEL; Status:Active; Requested Cecily Stroud;    Perform:[facility]; [phone]; Ordered; For:Fatigue, Thyroid enlargement; Ordered By:Socorro Hernández;   · (1) TSH WITH FT4 REFLEX; Status:Active; Requested for:2017;    Perform:[facility]; [phone]; Ordered; For:Fatigue, Thyroid enlargement; Ordered By:Socorro Hernández; Discussion/Summary  Discussion Summary:   1  Fatigue/ thyroid enlargement - will obtain labs: CBC, CMP, TSH and call with results, continue prenatal vitamins  2  Postpartum - f/u with Ob/ Gyn Dr Kehinde Alvarado in 1 year for PE or sooner if needed  Medication SE Review and Pt Understands Tx: The treatment plan was reviewed with the patient/guardian  The patient/guardian understands and agrees with the treatment plan      Chief Complaint  Chief Complaint Free Text Note Form: Pt presents here for a DENNIS appointment;    pap due      History of Present Illness  TCM Communication [facility]: The patient is being contacted for follow-up after hospitalization  She was hospitalized at [facility]  The date of admission: 2017, date of discharge: 2017  Diagnosis: Pregnancy  She was discharged to home  Medications reviewed and updated today  She scheduled a follow up appointment  The patient is currently asymptomatic  Counseling was provided to the patient  Communication performed and completed by Yuriy Cifuentes 2017   HPI: Pt presents today for f/u hospitalization after having a baby via  on  without complications, she was d/c'd home on 17   She had prenatal care at [address] [place] [address] which she started late at almost 20 weeks and reports no complications during her pregnancy  Her baby is doing well and breast feeding without difficulty  Pt states she is still feeling very tired, but now getting more sleep and overall feeling better  Pt denies depressed mood, anxiety or emotional problems  She lives with her boyfriend/ FOB and their 13 month old daughter  Her boyfriend is a big help with the baby  Review of Systems  Complete-Female:   Constitutional: no fever and no chills  ENT: no earache, no sore throat, no nasal discharge and no hoarseness  Cardiovascular: No complaints of slow heart rate, no fast heart rate, no chest pain, no palpitations, no leg claudication, no lower extremity edema  Respiratory: No complaints of shortness of breath, no wheezing, no cough, no SOB on exertion, no orthopnea, no PND  Gastrointestinal: No complaints of abdominal pain, no constipation, no nausea or vomiting, no diarrhea, no bloody stools  Genitourinary: no dysuria, no pelvic pain and no unexplained vaginal bleeding  Integumentary: no breast pain and no breast lump  Neurological: no headache, no numbness, no tingling, no dizziness, no limb weakness, no fainting and no difficulty walking  Psychiatric: no anxiety, no depression and no emotional problems  Active Problems    1  Choroid plexus cysts, fetal, affecting care of mother, antepartum (655 03) (O35 8XX0)   2  Encounter for fetal anatomic survey (V28 81) (Z36 89)   3  Encounter for screening for risk of pre-term labor (V28 82) (Z36 86)   4  Fatigue (780 79) (R53 83)   5  Thyroid enlargement (240 9) (E04 9)    Surgical History    1  History of  Section    Family History  Mother    1  Denied: Family history of substance abuse   2  Family history of Living and Healthy  Father    3  Denied: Family history of substance abuse   4  Family history of Living and Healthy  Maternal Grandfather    5  Family history of Alzheimer's disease (V17 2) (Z82 0)  Paternal Grandfather    10   Family history of cardiac disorder (V17 49) (Z82 49)  Family History Reviewed: The family history was reviewed and updated today  Social History    · Former smoker (C13 04) (F05 162)  Social History Reviewed: The social history was reviewed and updated today  Current Meds   1  Prenatal Vitamins TABS; TAKE TABLET  per pt 1 tab daily; Therapy: (Recorded:02Mar2016) to Recorded    Allergies    1  No Known Drug Allergies    2  Seasonal    Vitals  Signs   Recorded: 14SFJ7723 11:10AM   Heart Rate: 84  Respiration: 16  Systolic: 906, RUE, Sitting  Diastolic: 70, RUE, Sitting  Height: 5 ft 4 75 in  Weight: 153 lb   BMI Calculated: 25 66  BSA Calculated: 1 76    Physical Exam    Constitutional   General appearance: No acute distress, well appearing and well nourished  Ears, Nose, Mouth, and Throat   Otoscopic examination: Tympanic membranes translucent with normal light reflex  Canals patent without erythema  Oropharynx: Normal with no erythema, edema, exudate or lesions  Pulmonary   Respiratory effort: No increased work of breathing or signs of respiratory distress  Auscultation of lungs: Clear to auscultation  Cardiovascular   Auscultation of heart: Normal rate and rhythm, normal S1 and S2, without murmurs  Examination of extremities for edema and/or varicosities: Normal     Abdomen   Abdomen: Non-tender, no masses  Liver and spleen: No hepatomegaly or splenomegaly  Lymphatic   Palpation of lymph nodes in neck: No lymphadenopathy  Neurologic   Cranial nerves: Cranial nerves 2-12 intact  Reflexes: 2+ and symmetric  Psychiatric   Orientation to person, place, and time: Normal     Mood and affect: Normal     Additional Exam:  Neck: mild thyroid enlargement, no palpable nodules          Results/Data  PHQ-2 Adult Depression Screening 29Nov2017 11:11AM User, Ahs     Test Name Result Flag Reference   PHQ-2 Adult Depression Score 0     Over the last two weeks, how often have you been bothered by any of the following problems? Little interest or pleasure in doing things: Not at all - 0  Feeling down, depressed, or hopeless: Not at all - 0   PHQ-2 Adult Depression Screening Negative         Future Appointments    Date/Time Provider Specialty Site   11/30/2018 11:00 AM MELINA Villa   [address]     Signatures   Electronically signed by : MELINA Salcedo ; Dec 14 2017  9:56AM EST                       (Author)

## 2019-05-20 ENCOUNTER — TELEPHONE (OUTPATIENT)
Dept: FAMILY MEDICINE CLINIC | Facility: CLINIC | Age: 24
End: 2019-05-20

## 2024-05-18 ENCOUNTER — APPOINTMENT (EMERGENCY)
Dept: RADIOLOGY | Facility: HOSPITAL | Age: 29
End: 2024-05-18
Payer: COMMERCIAL

## 2024-05-18 ENCOUNTER — HOSPITAL ENCOUNTER (EMERGENCY)
Facility: HOSPITAL | Age: 29
Discharge: HOME | End: 2024-05-18
Attending: STUDENT IN AN ORGANIZED HEALTH CARE EDUCATION/TRAINING PROGRAM
Payer: COMMERCIAL

## 2024-05-18 VITALS
HEART RATE: 92 BPM | DIASTOLIC BLOOD PRESSURE: 62 MMHG | WEIGHT: 179.6 LBS | TEMPERATURE: 97 F | RESPIRATION RATE: 20 BRPM | BODY MASS INDEX: 30.12 KG/M2 | OXYGEN SATURATION: 99 % | SYSTOLIC BLOOD PRESSURE: 106 MMHG

## 2024-05-18 DIAGNOSIS — R10.2 PELVIC PAIN: Primary | ICD-10-CM

## 2024-05-18 LAB
ANION GAP SERPL CALCULATED.3IONS-SCNC: 4 MMOL/L (ref 4–13)
APTT PPP: 26 SECONDS (ref 23–37)
BACTERIA UR QL AUTO: ABNORMAL /HPF
BASOPHILS # BLD AUTO: 0.07 THOUSANDS/ÂΜL (ref 0–0.1)
BASOPHILS NFR BLD AUTO: 1 % (ref 0–1)
BILIRUB UR QL STRIP: NEGATIVE
BUN SERPL-MCNC: 9 MG/DL (ref 5–25)
CALCIUM SERPL-MCNC: 8.7 MG/DL (ref 8.4–10.2)
CHLORIDE SERPL-SCNC: 105 MMOL/L (ref 96–108)
CLARITY UR: CLEAR
CO2 SERPL-SCNC: 28 MMOL/L (ref 21–32)
COLOR UR: YELLOW
CREAT SERPL-MCNC: 0.7 MG/DL (ref 0.6–1.3)
EOSINOPHIL # BLD AUTO: 0.1 THOUSAND/ÂΜL (ref 0–0.61)
EOSINOPHIL NFR BLD AUTO: 1 % (ref 0–6)
ERYTHROCYTE [DISTWIDTH] IN BLOOD BY AUTOMATED COUNT: 12 % (ref 11.6–15.1)
EXT PREGNANCY TEST URINE: NEGATIVE
EXT. CONTROL: NORMAL
GFR SERPL CREATININE-BSD FRML MDRD: 116 ML/MIN/1.73SQ M
GLUCOSE SERPL-MCNC: 82 MG/DL (ref 65–140)
GLUCOSE UR STRIP-MCNC: NEGATIVE MG/DL
HCT VFR BLD AUTO: 38.4 % (ref 34.8–46.1)
HGB BLD-MCNC: 12.9 G/DL (ref 11.5–15.4)
HGB UR QL STRIP.AUTO: ABNORMAL
IMM GRANULOCYTES # BLD AUTO: 0.03 THOUSAND/UL (ref 0–0.2)
IMM GRANULOCYTES NFR BLD AUTO: 0 % (ref 0–2)
INR PPP: 0.84 (ref 0.84–1.19)
KETONES UR STRIP-MCNC: NEGATIVE MG/DL
LEUKOCYTE ESTERASE UR QL STRIP: NEGATIVE
LYMPHOCYTES # BLD AUTO: 1.33 THOUSANDS/ÂΜL (ref 0.6–4.47)
LYMPHOCYTES NFR BLD AUTO: 15 % (ref 14–44)
MCH RBC QN AUTO: 31.4 PG (ref 26.8–34.3)
MCHC RBC AUTO-ENTMCNC: 33.6 G/DL (ref 31.4–37.4)
MCV RBC AUTO: 93 FL (ref 82–98)
MONOCYTES # BLD AUTO: 0.52 THOUSAND/ÂΜL (ref 0.17–1.22)
MONOCYTES NFR BLD AUTO: 6 % (ref 4–12)
MUCOUS THREADS UR QL AUTO: ABNORMAL
NEUTROPHILS # BLD AUTO: 6.87 THOUSANDS/ÂΜL (ref 1.85–7.62)
NEUTS SEG NFR BLD AUTO: 77 % (ref 43–75)
NITRITE UR QL STRIP: NEGATIVE
NON-SQ EPI CELLS URNS QL MICRO: ABNORMAL /HPF
NRBC BLD AUTO-RTO: 0 /100 WBCS
OTHER STN SPEC: ABNORMAL
PH UR STRIP.AUTO: 6.5 [PH]
PLATELET # BLD AUTO: 230 THOUSANDS/UL (ref 149–390)
PMV BLD AUTO: 9.4 FL (ref 8.9–12.7)
POTASSIUM SERPL-SCNC: 3.7 MMOL/L (ref 3.5–5.3)
PROT UR STRIP-MCNC: NEGATIVE MG/DL
PROTHROMBIN TIME: 11.7 SECONDS (ref 11.6–14.5)
RBC # BLD AUTO: 4.11 MILLION/UL (ref 3.81–5.12)
RBC #/AREA URNS AUTO: ABNORMAL /HPF
SODIUM SERPL-SCNC: 137 MMOL/L (ref 135–147)
SP GR UR STRIP.AUTO: >=1.03 (ref 1–1.03)
TSH SERPL DL<=0.05 MIU/L-ACNC: 3.4 UIU/ML (ref 0.45–4.5)
UROBILINOGEN UR STRIP-ACNC: 2 MG/DL
WBC # BLD AUTO: 8.92 THOUSAND/UL (ref 4.31–10.16)
WBC #/AREA URNS AUTO: ABNORMAL /HPF

## 2024-05-18 PROCEDURE — 81001 URINALYSIS AUTO W/SCOPE: CPT | Performed by: STUDENT IN AN ORGANIZED HEALTH CARE EDUCATION/TRAINING PROGRAM

## 2024-05-18 PROCEDURE — 80048 BASIC METABOLIC PNL TOTAL CA: CPT | Performed by: STUDENT IN AN ORGANIZED HEALTH CARE EDUCATION/TRAINING PROGRAM

## 2024-05-18 PROCEDURE — 76856 US EXAM PELVIC COMPLETE: CPT

## 2024-05-18 PROCEDURE — 85025 COMPLETE CBC W/AUTO DIFF WBC: CPT | Performed by: STUDENT IN AN ORGANIZED HEALTH CARE EDUCATION/TRAINING PROGRAM

## 2024-05-18 PROCEDURE — 81025 URINE PREGNANCY TEST: CPT | Performed by: STUDENT IN AN ORGANIZED HEALTH CARE EDUCATION/TRAINING PROGRAM

## 2024-05-18 PROCEDURE — 99284 EMERGENCY DEPT VISIT MOD MDM: CPT | Performed by: STUDENT IN AN ORGANIZED HEALTH CARE EDUCATION/TRAINING PROGRAM

## 2024-05-18 PROCEDURE — 84443 ASSAY THYROID STIM HORMONE: CPT | Performed by: STUDENT IN AN ORGANIZED HEALTH CARE EDUCATION/TRAINING PROGRAM

## 2024-05-18 PROCEDURE — 85730 THROMBOPLASTIN TIME PARTIAL: CPT | Performed by: STUDENT IN AN ORGANIZED HEALTH CARE EDUCATION/TRAINING PROGRAM

## 2024-05-18 PROCEDURE — 85610 PROTHROMBIN TIME: CPT | Performed by: STUDENT IN AN ORGANIZED HEALTH CARE EDUCATION/TRAINING PROGRAM

## 2024-05-18 PROCEDURE — 76830 TRANSVAGINAL US NON-OB: CPT

## 2024-05-18 PROCEDURE — 99284 EMERGENCY DEPT VISIT MOD MDM: CPT

## 2024-05-18 PROCEDURE — 36415 COLL VENOUS BLD VENIPUNCTURE: CPT | Performed by: STUDENT IN AN ORGANIZED HEALTH CARE EDUCATION/TRAINING PROGRAM

## 2024-05-19 NOTE — DISCHARGE INSTRUCTIONS
The you were seen in the emergency department for pelvic pain.  As discussed you will need to follow-up with a gynecologist.  A referral was sent.  Please call to schedule appointment.  Your ultrasound reading as below:    1. No evidence of ovarian torsion.     2. Nonspecific 4 mm echogenic structure in the right ovary could be related to an involuting hemorrhagic follicle or tiny dermoid. A 6-month follow-up pelvic ultrasound could be considered if clinically warranted.     3. Mildly coarsened heterogeneous echotexture which can be seen with adenomyosis. If more sensitive and specific evaluation for uterine adenomyosis is clinically warranted, contrast-enhanced pelvic MRI may be considered.       As discussed you will need follow-up for these findings.  Please call as soon as possible to schedule your gynecology appointment.  Return to the emergency room for any worsening pain, fevers, or for any other new or concerning symptoms.

## 2024-05-19 NOTE — ED PROVIDER NOTES
"History  Chief Complaint   Patient presents with    Pelvic Pain     States for the past several months when she gets her period she gets back and pelvic pain \" like contractions \". States today Midol is not working. States her GYN retired.      Patient is a 28-year-old female, past medical history including anxiety, and depression, who presents to the emergency room for pelvic pain.  Patient states usually her periods last couple days and are mild.  However, for the past couple months every time she has her period she is having severe lower abdominal cramping.  It starts in her back and radiates on the front.  Today the pain is mainly localized to the left lower abdomen.  No modifying factors.  No other associated symptoms.  No known history of cyst.  Does not currently have a gynecologist.  No other complaints or concerns.        Prior to Admission Medications   Prescriptions Last Dose Informant Patient Reported? Taking?   Prenatal Vit-Iron Carbonyl-FA (PRENATAL MULTIVITAMIN) TABS   Yes No   Sig: Take 1 tablet by mouth daily.   acetaminophen (TYLENOL) 325 mg tablet   No No   Sig: Take 2 tablets by mouth every 4 (four) hours as needed for mild pain, moderate pain, severe pain, headaches or fever   ibuprofen (MOTRIN) 600 mg tablet   No No   Sig: Take 1 tablet by mouth every 6 (six) hours as needed for mild pain, moderate pain, fever or headaches (cramping)      Facility-Administered Medications: None       Past Medical History:   Diagnosis Date    Anxiety     Depression     Seasonal allergies     Varicella     vaccine        Past Surgical History:   Procedure Laterality Date     SECTION N/A 2016    Procedure:  SECTION ();  Surgeon: Norbert Do MD;  Location: [facility];  Service:        Family History   Problem Relation Age of Onset    Arthritis Paternal Grandmother     Alzheimer's disease Maternal Grandfather     Other Paternal Grandfather         cardiac disorder     I have reviewed and " agree with the history as documented.    E-Cigarette/Vaping    E-Cigarette Use Never User      E-Cigarette/Vaping Substances     Social History     Tobacco Use    Smoking status: Former     Current packs/day: 0.00     Types: Cigarettes     Quit date:      Years since quittin.3    Smokeless tobacco: Never   Vaping Use    Vaping status: Never Used   Substance Use Topics    Alcohol use: No    Drug use: No       Review of Systems   Constitutional:  Negative for chills and fever.   Genitourinary:  Positive for pelvic pain.   All other systems reviewed and are negative.      Physical Exam  Physical Exam  Vitals and nursing note reviewed.   Constitutional:       General: She is not in acute distress.     Appearance: She is well-developed. She is not ill-appearing, toxic-appearing or diaphoretic.   HENT:      Head: Normocephalic and atraumatic.      Right Ear: External ear normal.      Left Ear: External ear normal.      Nose: Nose normal.   Eyes:      General: Lids are normal. No scleral icterus.  Cardiovascular:      Rate and Rhythm: Normal rate and regular rhythm.      Heart sounds: Normal heart sounds. No murmur heard.     No friction rub. No gallop.   Pulmonary:      Effort: Pulmonary effort is normal. No respiratory distress.      Breath sounds: Normal breath sounds. No wheezing or rales.   Abdominal:      Palpations: Abdomen is soft.      Tenderness: There is abdominal tenderness in the left lower quadrant. There is no guarding or rebound.   Musculoskeletal:         General: No deformity. Normal range of motion.      Cervical back: Normal range of motion and neck supple.   Skin:     General: Skin is warm and dry.   Neurological:      General: No focal deficit present.      Mental Status: She is alert.   Psychiatric:         Mood and Affect: Mood normal.         Behavior: Behavior normal.         Vital Signs  ED Triage Vitals [24]   Temperature Pulse Respirations Blood Pressure SpO2   (!) 97 °F  (36.1 °C) 104 20 138/77 99 %      Temp Source Heart Rate Source Patient Position - Orthostatic VS BP Location FiO2 (%)   Tympanic Monitor Sitting Left arm --      Pain Score       5           Vitals:    05/18/24 1919 05/18/24 2154   BP: 138/77 106/62   Pulse: 104 92   Patient Position - Orthostatic VS: Sitting          Visual Acuity      ED Medications  Medications - No data to display    Diagnostic Studies  Results Reviewed       Procedure Component Value Units Date/Time    Urine Microscopic [[id number]]  (Abnormal) Collected: 05/18/24 2154    Lab Status: Final result Specimen: Urine, Clean Catch Updated: 05/18/24 2234     RBC, UA 1-2 /hpf      WBC, UA 1-2 /hpf      Epithelial Cells Moderate /hpf      Bacteria, UA Occasional /hpf      MUCUS THREADS Occasional     OTHER OBSERVATIONS Transitional Epithelial Cells    TSH, 3rd generation with Free T4 reflex [[id number]]  (Normal) Collected: 05/18/24 2007    Lab Status: Final result Specimen: Blood from Arm, Left Updated: 05/18/24 2222     TSH 3RD GENERATON 3.402 uIU/mL     POCT pregnancy, urine [[id number]]  (Normal) Resulted: 05/18/24 2206    Lab Status: Final result Updated: 05/18/24 2206     EXT Preg Test, Ur Negative     Control Valid    UA w Reflex to Microscopic w Reflex to Culture [[id number]]  (Abnormal) Collected: 05/18/24 2154    Lab Status: Final result Specimen: Urine, Clean Catch Updated: 05/18/24 2201     Color, UA Yellow     Clarity, UA Clear     Specific Gravity, UA >=1.030     pH, UA 6.5     Leukocytes, UA Negative     Nitrite, UA Negative     Protein, UA Negative mg/dl      Glucose, UA Negative mg/dl      Ketones, UA Negative mg/dl      Urobilinogen, UA 2.0 mg/dl      Bilirubin, UA Negative     Occult Blood, UA Moderate    APTT [[id number]]  (Normal) Collected: 05/18/24 2028    Lab Status: Final result Specimen: Blood from Arm, Left Updated: 05/18/24 2045     PTT 26 seconds     Protime-INR [[id number]]  (Normal) Collected: 05/18/24 2028    Lab Status: Final  result Specimen: Blood from Arm, Left Updated: 05/18/24 2045     Protime 11.7 seconds      INR 0.84    Basic metabolic panel [[id number]] Collected: 05/18/24 2007    Lab Status: Final result Specimen: Blood from Arm, Left Updated: 05/18/24 2027     Sodium 137 mmol/L      Potassium 3.7 mmol/L      Chloride 105 mmol/L      CO2 28 mmol/L      ANION GAP 4 mmol/L      BUN 9 mg/dL      Creatinine 0.70 mg/dL      Glucose 82 mg/dL      Calcium 8.7 mg/dL      eGFR 116 ml/min/1.73sq m     Narrative:      National Kidney Disease Foundation guidelines for Chronic Kidney Disease (CKD):     Stage 1 with normal or high GFR (GFR > 90 mL/min/1.73 square meters)    Stage 2 Mild CKD (GFR = 60-89 mL/min/1.73 square meters)    Stage 3A Moderate CKD (GFR = 45-59 mL/min/1.73 square meters)    Stage 3B Moderate CKD (GFR = 30-44 mL/min/1.73 square meters)    Stage 4 Severe CKD (GFR = 15-29 mL/min/1.73 square meters)    Stage 5 End Stage CKD (GFR <15 mL/min/1.73 square meters)  Note: GFR calculation is accurate only with a steady state creatinine    CBC and differential [[id number]]  (Abnormal) Collected: 05/18/24 2007    Lab Status: Final result Specimen: Blood from Arm, Left Updated: 05/18/24 2012     WBC 8.92 Thousand/uL      RBC 4.11 Million/uL      Hemoglobin 12.9 g/dL      Hematocrit 38.4 %      MCV 93 fL      MCH 31.4 pg      MCHC 33.6 g/dL      RDW 12.0 %      MPV 9.4 fL      Platelets 230 Thousands/uL      nRBC 0 /100 WBCs      Segmented % 77 %      Immature Grans % 0 %      Lymphocytes % 15 %      Monocytes % 6 %      Eosinophils Relative 1 %      Basophils Relative 1 %      Absolute Neutrophils 6.87 Thousands/µL      Absolute Immature Grans 0.03 Thousand/uL      Absolute Lymphocytes 1.33 Thousands/µL      Absolute Monocytes 0.52 Thousand/µL      Eosinophils Absolute 0.10 Thousand/µL      Basophils Absolute 0.07 Thousands/µL                    US pelvis complete w transvaginal   Final Result by Castillo Whitten MD (05/18 2150)      1.  No evidence of ovarian torsion.      2. Nonspecific 4 mm echogenic structure in the right ovary could be related to an involuting hemorrhagic follicle or tiny dermoid. A 6-month follow-up pelvic ultrasound could be considered if clinically warranted.      3. Mildly coarsened heterogeneous echotexture which can be seen with adenomyosis. If more sensitive and specific evaluation for uterine adenomyosis is clinically warranted, contrast-enhanced pelvic MRI may be considered.                              Workstation performed: [id number]                    Procedures  Procedures         ED Course  ED Course as of 05/18/24 2242   Sat May 18, 2024   2153 US pelvis complete w transvaginal  1. No evidence of ovarian torsion.     2. Nonspecific 4 mm echogenic structure in the right ovary could be related to an involuting hemorrhagic follicle or tiny dermoid. A 6-month follow-up pelvic ultrasound could be considered if clinically warranted.     3. Mildly coarsened heterogeneous echotexture which can be seen with adenomyosis. If more sensitive and specific evaluation for uterine adenomyosis is clinically warranted, contrast-enhanced pelvic MRI may be considered.        2159 Patient reevaluated.  Resting comfortably.  No new complaints.  Pain controlled.  Discussed results and findings.  No obvious emergent cause of her symptoms.  Will discharge.  Discussed structure on right ovary as well as possible adenomyosis.  Discussed need for GYN follow-up.  Referral for follow-up sent.  Return precautions discussed.  Patient verbalized understanding and agreed to plan of care.                               SBIRT 22yo+      Flowsheet Row Most Recent Value   Initial Alcohol Screen: US AUDIT-C     1. How often do you have a drink containing alcohol? 1 Filed at: 05/18/2024 1923   2. How many drinks containing alcohol do you have on a typical day you are drinking?  0 Filed at: 05/18/2024 1923   3b. FEMALE Any Age, or MALE 65+: How often do  "you have 4 or more drinks on one occassion? 0 Filed at: 05/18/2024 1923   Audit-C Score 1 Filed at: 05/18/2024 1923   JOLEEN: How many times in the past year have you...    Used an illegal drug or used a prescription medication for non-medical reasons? Never Filed at: 05/18/2024 1923                      Medical Decision Making  Patient is a 28 y.o. female who presents to the ED for worsening pelvic pain, recently localized to left lower quadrant.  Patient is nontoxic, well-appearing.  Vitals are stable.  On exam she has mild to moderate left lower quadrant tenderness without rebound or guarding.    Differential includes but is not limited to: Ovarian cyst, ovarian torsion, dysmenorrhea.  Low suspicion for PID.    Plan: Pelvic ultrasound, labs, reassess                 Portions of the record may have been created with voice recognition software. Occasional wrong word or \"sound a like\" substitutions may have occurred due to the inherent limitations of voice recognition software. Read the chart carefully and recognize, using context, where substitutions have occurred.    Amount and/or Complexity of Data Reviewed  Labs: ordered.  Radiology: ordered. Decision-making details documented in ED Course.             Disposition  Final diagnoses:   Pelvic pain     Time reflects when diagnosis was documented in both MDM as applicable and the Disposition within this note       Time User Action Codes Description Comment    5/18/2024  9:56 PM Hesham Sanchez Add [R10.2] Pelvic pain           ED Disposition       ED Disposition   Discharge    Condition   Stable    Date/Time   Sat May 18, 2024 2106    Comment   Sivan Swain discharge to home/self care.                   Follow-up Information       Follow up With Specialties Details Why Contact Info Additional Information    Socorro Hernández MD Family Medicine   [address] [facility]  [address] [place] [address]  [phone]       [facility] Emergency " Department Emergency Medicine   [address] [facility] [address]  [phone] [facility] Emergency Department, [address] [place], [place], [address]    [facility] [place] [facility] Schedule an appointment as soon as possible for a visit   [address] [id number] [phone] [facility] [place], [address], [id number] [phone]            Discharge Medication List as of 5/18/2024  9:57 PM        CONTINUE these medications which have NOT CHANGED    Details   acetaminophen (TYLENOL) 325 mg tablet Take 2 tablets by mouth every 4 (four) hours as needed for mild pain, moderate pain, severe pain, headaches or fever, Starting Wed 11/22/2017, No Print      ibuprofen (MOTRIN) 600 mg tablet Take 1 tablet by mouth every 6 (six) hours as needed for mild pain, moderate pain, fever or headaches (cramping), Starting Wed 11/22/2017, No Print      Prenatal Vit-Iron Carbonyl-FA (PRENATAL MULTIVITAMIN) TABS Take 1 tablet by mouth daily., Until Discontinued, Historical Med                 PDMP Review       None            ED Provider  Electronically Signed by             Hesham Sanchez DO  05/18/24 4915

## 2024-05-19 NOTE — ED NOTES
US on-call tech Elsa Lewis contacted via [employer] about US. Elsa will arrive shortly to perform US.      Ivis Quiroz RN  05/18/24 8649

## 2024-08-15 ENCOUNTER — APPOINTMENT (EMERGENCY)
Dept: RADIOLOGY | Facility: HOSPITAL | Age: 29
End: 2024-08-15
Payer: COMMERCIAL

## 2024-08-15 ENCOUNTER — HOSPITAL ENCOUNTER (EMERGENCY)
Facility: HOSPITAL | Age: 29
Discharge: HOME | End: 2024-08-15
Attending: STUDENT IN AN ORGANIZED HEALTH CARE EDUCATION/TRAINING PROGRAM
Payer: COMMERCIAL

## 2024-08-15 VITALS
RESPIRATION RATE: 20 BRPM | HEART RATE: 109 BPM | OXYGEN SATURATION: 98 % | SYSTOLIC BLOOD PRESSURE: 118 MMHG | TEMPERATURE: 98.1 F | WEIGHT: 176.4 LBS | BODY MASS INDEX: 29.58 KG/M2 | DIASTOLIC BLOOD PRESSURE: 77 MMHG

## 2024-08-15 DIAGNOSIS — J03.90 TONSILLITIS: Primary | ICD-10-CM

## 2024-08-15 LAB
ALBUMIN SERPL BCG-MCNC: 4.1 G/DL (ref 3.5–5)
ALP SERPL-CCNC: 62 U/L (ref 34–104)
ALT SERPL W P-5'-P-CCNC: 31 U/L (ref 7–52)
AMPHETAMINES SERPL QL SCN: NEGATIVE
ANION GAP SERPL CALCULATED.3IONS-SCNC: 9 MMOL/L (ref 4–13)
AST SERPL W P-5'-P-CCNC: 23 U/L (ref 13–39)
BARBITURATES UR QL: NEGATIVE
BASOPHILS # BLD AUTO: 0.06 THOUSANDS/ÂΜL (ref 0–0.1)
BASOPHILS NFR BLD AUTO: 0 % (ref 0–1)
BENZODIAZ UR QL: NEGATIVE
BILIRUB SERPL-MCNC: 0.92 MG/DL (ref 0.2–1)
BUN SERPL-MCNC: 6 MG/DL (ref 5–25)
CALCIUM SERPL-MCNC: 8.7 MG/DL (ref 8.4–10.2)
CHLORIDE SERPL-SCNC: 100 MMOL/L (ref 96–108)
CO2 SERPL-SCNC: 24 MMOL/L (ref 21–32)
COCAINE UR QL: NEGATIVE
CREAT SERPL-MCNC: 0.81 MG/DL (ref 0.6–1.3)
EOSINOPHIL # BLD AUTO: 0.08 THOUSAND/ÂΜL (ref 0–0.61)
EOSINOPHIL NFR BLD AUTO: 0 % (ref 0–6)
ERYTHROCYTE [DISTWIDTH] IN BLOOD BY AUTOMATED COUNT: 12.4 % (ref 11.6–15.1)
FENTANYL UR QL SCN: NEGATIVE
FLUAV RNA RESP QL NAA+PROBE: NEGATIVE
FLUBV RNA RESP QL NAA+PROBE: NEGATIVE
GFR SERPL CREATININE-BSD FRML MDRD: 97 ML/MIN/1.73SQ M
GLUCOSE SERPL-MCNC: 123 MG/DL (ref 65–140)
HCT VFR BLD AUTO: 40.8 % (ref 34.8–46.1)
HETEROPH AB SER QL: NEGATIVE
HGB BLD-MCNC: 13.7 G/DL (ref 11.5–15.4)
HYDROCODONE UR QL SCN: NEGATIVE
IMM GRANULOCYTES # BLD AUTO: 0.08 THOUSAND/UL (ref 0–0.2)
IMM GRANULOCYTES NFR BLD AUTO: 0 % (ref 0–2)
LYMPHOCYTES # BLD AUTO: 1.48 THOUSANDS/ÂΜL (ref 0.6–4.47)
LYMPHOCYTES NFR BLD AUTO: 8 % (ref 14–44)
MCH RBC QN AUTO: 31.3 PG (ref 26.8–34.3)
MCHC RBC AUTO-ENTMCNC: 33.6 G/DL (ref 31.4–37.4)
MCV RBC AUTO: 93 FL (ref 82–98)
METHADONE UR QL: NEGATIVE
MONOCYTES # BLD AUTO: 1.98 THOUSAND/ÂΜL (ref 0.17–1.22)
MONOCYTES NFR BLD AUTO: 10 % (ref 4–12)
NEUTROPHILS # BLD AUTO: 15.86 THOUSANDS/ÂΜL (ref 1.85–7.62)
NEUTS SEG NFR BLD AUTO: 82 % (ref 43–75)
NRBC BLD AUTO-RTO: 0 /100 WBCS
OPIATES UR QL SCN: NEGATIVE
OXYCODONE+OXYMORPHONE UR QL SCN: NEGATIVE
PCP UR QL: NEGATIVE
PLATELET # BLD AUTO: 212 THOUSANDS/UL (ref 149–390)
PMV BLD AUTO: 9.2 FL (ref 8.9–12.7)
POTASSIUM SERPL-SCNC: 3.2 MMOL/L (ref 3.5–5.3)
PROT SERPL-MCNC: 7 G/DL (ref 6.4–8.4)
RBC # BLD AUTO: 4.38 MILLION/UL (ref 3.81–5.12)
RSV RNA RESP QL NAA+PROBE: NEGATIVE
S PYO DNA THROAT QL NAA+PROBE: NOT DETECTED
SARS-COV-2 RNA RESP QL NAA+PROBE: NEGATIVE
SODIUM SERPL-SCNC: 133 MMOL/L (ref 135–147)
THC UR QL: NEGATIVE
WBC # BLD AUTO: 19.54 THOUSAND/UL (ref 4.31–10.16)

## 2024-08-15 PROCEDURE — 0241U HB NFCT DS VIR RESP RNA 4 TRGT: CPT | Performed by: PHYSICIAN ASSISTANT

## 2024-08-15 PROCEDURE — 96361 HYDRATE IV INFUSION ADD-ON: CPT

## 2024-08-15 PROCEDURE — 99283 EMERGENCY DEPT VISIT LOW MDM: CPT

## 2024-08-15 PROCEDURE — 85025 COMPLETE CBC W/AUTO DIFF WBC: CPT | Performed by: PHYSICIAN ASSISTANT

## 2024-08-15 PROCEDURE — 36415 COLL VENOUS BLD VENIPUNCTURE: CPT | Performed by: PHYSICIAN ASSISTANT

## 2024-08-15 PROCEDURE — 87651 STREP A DNA AMP PROBE: CPT | Performed by: PHYSICIAN ASSISTANT

## 2024-08-15 PROCEDURE — 80053 COMPREHEN METABOLIC PANEL: CPT | Performed by: PHYSICIAN ASSISTANT

## 2024-08-15 PROCEDURE — 70491 CT SOFT TISSUE NECK W/DYE: CPT

## 2024-08-15 PROCEDURE — 99285 EMERGENCY DEPT VISIT HI MDM: CPT | Performed by: PHYSICIAN ASSISTANT

## 2024-08-15 PROCEDURE — 96374 THER/PROPH/DIAG INJ IV PUSH: CPT

## 2024-08-15 PROCEDURE — 86308 HETEROPHILE ANTIBODY SCREEN: CPT | Performed by: PHYSICIAN ASSISTANT

## 2024-08-15 PROCEDURE — 80307 DRUG TEST PRSMV CHEM ANLYZR: CPT | Performed by: PHYSICIAN ASSISTANT

## 2024-08-15 RX ORDER — CLINDAMYCIN HCL 300 MG
300 CAPSULE ORAL EVERY 8 HOURS SCHEDULED
Qty: 30 CAPSULE | Refills: 0 | Status: SHIPPED | OUTPATIENT
Start: 2024-08-15 | End: 2024-08-25

## 2024-08-15 RX ORDER — POTASSIUM CHLORIDE 1500 MG/1
40 TABLET, EXTENDED RELEASE ORAL ONCE
Status: COMPLETED | OUTPATIENT
Start: 2024-08-15 | End: 2024-08-15

## 2024-08-15 RX ORDER — CLINDAMYCIN HCL 150 MG
300 CAPSULE ORAL ONCE
Status: COMPLETED | OUTPATIENT
Start: 2024-08-15 | End: 2024-08-15

## 2024-08-15 RX ORDER — DEXAMETHASONE SODIUM PHOSPHATE 10 MG/ML
10 INJECTION, SOLUTION INTRAMUSCULAR; INTRAVENOUS ONCE
Status: COMPLETED | OUTPATIENT
Start: 2024-08-15 | End: 2024-08-15

## 2024-08-15 RX ORDER — CLINDAMYCIN HCL 300 MG
300 CAPSULE ORAL EVERY 8 HOURS SCHEDULED
Qty: 30 CAPSULE
Start: 2024-08-15 | End: 2024-08-15

## 2024-08-15 RX ADMIN — CLINDAMYCIN HYDROCHLORIDE 300 MG: 150 CAPSULE ORAL at 14:01

## 2024-08-15 RX ADMIN — DEXAMETHASONE SODIUM PHOSPHATE 10 MG: 10 INJECTION, SOLUTION INTRAMUSCULAR; INTRAVENOUS at 11:49

## 2024-08-15 RX ADMIN — POTASSIUM CHLORIDE 40 MEQ: 1500 TABLET, EXTENDED RELEASE ORAL at 14:03

## 2024-08-15 RX ADMIN — IOHEXOL 85 ML: 350 INJECTION, SOLUTION INTRAVENOUS at 12:51

## 2024-08-15 RX ADMIN — SODIUM CHLORIDE 1000 ML: 0.9 INJECTION, SOLUTION INTRAVENOUS at 11:50

## 2024-08-15 NOTE — DISCHARGE INSTRUCTIONS
Clindamcyin three times daily x 10 days    May take tylenol or ibuprofen (with food) for pain     Follow up with ENT Dr Contreras early next week. Call for appointment    Return to ED for voice change, drooling, unable to fully open jaw.

## 2024-08-15 NOTE — ED PROVIDER NOTES
History  Chief Complaint   Patient presents with    Sore Throat     States started 2 days ago with sore throat, headache and runny nose. No cough. No Tylenol or Motrin taken. States she cannot eat and she cannot talk States change in voice.      Patient is a 28-year-old white female with history of anxiety, depression who reports 2-day history of sore throat.  No fever.  Has felt chilled but has not taken her temperature.  No drooling.  No voice change.  No cough.  Reports history of strep as a child but not in recent years.  No abdominal pain, nausea vomiting or diarrhea.        Prior to Admission Medications   Prescriptions Last Dose Informant Patient Reported? Taking?   Prenatal Vit-Iron Carbonyl-FA (PRENATAL MULTIVITAMIN) TABS   Yes No   Sig: Take 1 tablet by mouth daily.   acetaminophen (TYLENOL) 325 mg tablet   No No   Sig: Take 2 tablets by mouth every 4 (four) hours as needed for mild pain, moderate pain, severe pain, headaches or fever   ibuprofen (MOTRIN) 600 mg tablet   No No   Sig: Take 1 tablet by mouth every 6 (six) hours as needed for mild pain, moderate pain, fever or headaches (cramping)      Facility-Administered Medications: None       Past Medical History:   Diagnosis Date    Anxiety     Depression     Seasonal allergies     Varicella     vaccine        Past Surgical History:   Procedure Laterality Date     SECTION N/A 2016    Procedure:  SECTION ();  Surgeon: Norbert Do MD;  Location: [facility];  Service:        Family History   Problem Relation Age of Onset    Arthritis Paternal Grandmother     Alzheimer's disease Maternal Grandfather     Other Paternal Grandfather         cardiac disorder     I have reviewed and agree with the history as documented.    E-Cigarette/Vaping    E-Cigarette Use Never User      E-Cigarette/Vaping Substances     Social History     Tobacco Use    Smoking status: Former     Current packs/day: 0.00     Types: Cigarettes     Quit date:       Years since quittin.6    Smokeless tobacco: Never   Vaping Use    Vaping status: Never Used   Substance Use Topics    Alcohol use: No    Drug use: No       Review of Systems   Constitutional:  Positive for chills. Negative for fever.   HENT:  Positive for sore throat. Negative for congestion, rhinorrhea, trouble swallowing and voice change.    Respiratory:  Negative for shortness of breath.    Cardiovascular:  Negative for chest pain.   Gastrointestinal:  Negative for abdominal pain, diarrhea and vomiting.   Neurological:  Negative for headaches.       Physical Exam  Physical Exam  Vitals and nursing note reviewed.   Constitutional:       General: She is not in acute distress.     Appearance: She is well-developed. She is not ill-appearing or diaphoretic.   HENT:      Head: Normocephalic and atraumatic.      Right Ear: Tympanic membrane and ear canal normal.      Left Ear: Tympanic membrane and ear canal normal.      Nose: No congestion or rhinorrhea.      Mouth/Throat:      Tonsils: Tonsillar exudate present.      Comments: Bilateral tonsillar swelling, erythema, exudate.  Eyes:      Conjunctiva/sclera: Conjunctivae normal.      Pupils: Pupils are equal, round, and reactive to light.   Neck:      Comments: Tender left submandibular lymphadenopathy.  Cardiovascular:      Rate and Rhythm: Normal rate.      Heart sounds: Normal heart sounds.   Pulmonary:      Effort: Pulmonary effort is normal.      Breath sounds: Normal breath sounds.   Abdominal:      Palpations: Abdomen is soft.      Tenderness: There is no abdominal tenderness.   Musculoskeletal:      Cervical back: Normal range of motion and neck supple.   Skin:     General: Skin is warm and dry.      Capillary Refill: Capillary refill takes less than 2 seconds.   Neurological:      General: No focal deficit present.      Mental Status: She is alert and oriented to person, place, and time.         Vital Signs  ED Triage Vitals [08/15/24 1122]    Temperature Pulse Respirations Blood Pressure SpO2   98.1 °F (36.7 °C) (!) 109 20 118/77 98 %      Temp Source Heart Rate Source Patient Position - Orthostatic VS BP Location FiO2 (%)   Tympanic Monitor Sitting Left arm --      Pain Score       10 - Worst Possible Pain           Vitals:    08/15/24 1122   BP: 118/77   Pulse: (!) 109   Patient Position - Orthostatic VS: Sitting         Visual Acuity      ED Medications  Medications   sodium chloride 0.9 % bolus 1,000 mL (0 mL Intravenous Stopped 8/15/24 1404)   dexamethasone (PF) (DECADRON) injection 10 mg (10 mg Intravenous Given 8/15/24 1149)   iohexol (OMNIPAQUE) 350 MG/ML injection (MULTI-DOSE) 85 mL (85 mL Intravenous Given 8/15/24 1251)   potassium chloride (Klor-Con M20) CR tablet 40 mEq (40 mEq Oral Given 8/15/24 1403)   clindamycin (CLEOCIN) capsule 300 mg (300 mg Oral Given 8/15/24 1401)       Diagnostic Studies  Results Reviewed       Procedure Component Value Units Date/Time    Rapid drug screen, urine [[id number]]  (Normal) Collected: 08/15/24 1256    Lab Status: Final result Specimen: Urine, Clean Catch Updated: 08/15/24 1321     Amph/Meth UR Negative     Barbiturate Ur Negative     Benzodiazepine Urine Negative     Cocaine Urine Negative     Methadone Urine Negative     Opiate Urine Negative     PCP Ur Negative     THC Urine Negative     Oxycodone Urine Negative     Fentanyl Urine Negative     HYDROCODONE URINE Negative    Narrative:      FOR MEDICAL PURPOSES ONLY.   IF CONFIRMATION NEEDED PLEASE CONTACT THE LAB WITHIN 5 DAYS.    Drug Screen Cutoff Levels:  AMPHETAMINE/METHAMPHETAMINES  1000 ng/mL  BARBITURATES     200 ng/mL  BENZODIAZEPINES     200 ng/mL  COCAINE      300 ng/mL  METHADONE      300 ng/mL  OPIATES      300 ng/mL  PHENCYCLIDINE     25 ng/mL  THC       50 ng/mL  OXYCODONE      100 ng/mL  FENTANYL      5 ng/mL  HYDROCODONE     300 ng/mL    FLU/RSV/COVID - if FLU/RSV clinically relevant [[id number]]  (Normal) Collected: 08/15/24 1155    Lab  Status: Final result Specimen: Nares from Nose Updated: 08/15/24 1240     SARS-CoV-2 Negative     INFLUENZA A PCR Negative     INFLUENZA B PCR Negative     RSV PCR Negative    Narrative:      This test has been performed using the CoV-2/Flu/RSV plus assay on the [employer] platform. This test has been validated by the  and verified by the performing laboratory.     This test is designed to amplify and detect the following: nucleocapsid (N), envelope (E), and RNA-dependent RNA polymerase (RdRP) genes of the SARS-CoV-2 genome; matrix (M), basic polymerase (PB2), and acidic protein (PA) segments of the influenza A genome; matrix (M) and non-structural protein (NS) segments of the influenza B genome, and the nucleocapsid genes of RSV A and RSV B.     Positive results are indicative of the presence of Flu A, Flu B, RSV, and/or SARS-CoV-2 RNA. Positive results for SARS-CoV-2 or suspected novel influenza should be reported to state, local, or federal health departments according to local reporting requirements.      All results should be assessed in conjunction with clinical presentation and other laboratory markers for clinical management.     FOR PEDIATRIC PATIENTS - copy/paste COVID Guidelines URL to browser: [email or web address]       Strep A PCR [[id number]]  (Normal) Collected: 08/15/24 1148    Lab Status: Final result Specimen: Throat Updated: 08/15/24 1228     STREP A PCR Not Detected    Comprehensive metabolic panel [[id number]]  (Abnormal) Collected: 08/15/24 1148    Lab Status: Final result Specimen: Blood from Arm, Right Updated: 08/15/24 1218     Sodium 133 mmol/L      Potassium 3.2 mmol/L      Chloride 100 mmol/L      CO2 24 mmol/L      ANION GAP 9 mmol/L      BUN 6 mg/dL      Creatinine 0.81 mg/dL      Glucose 123 mg/dL      Calcium 8.7 mg/dL      AST 23 U/L      ALT 31 U/L      Alkaline Phosphatase 62 U/L      Total Protein 7.0 g/dL       Albumin 4.1 g/dL      Total Bilirubin 0.92 mg/dL      eGFR 97 ml/min/1.73sq m     Narrative:      National Kidney Disease Foundation guidelines for Chronic Kidney Disease (CKD):     Stage 1 with normal or high GFR (GFR > 90 mL/min/1.73 square meters)    Stage 2 Mild CKD (GFR = 60-89 mL/min/1.73 square meters)    Stage 3A Moderate CKD (GFR = 45-59 mL/min/1.73 square meters)    Stage 3B Moderate CKD (GFR = 30-44 mL/min/1.73 square meters)    Stage 4 Severe CKD (GFR = 15-29 mL/min/1.73 square meters)    Stage 5 End Stage CKD (GFR <15 mL/min/1.73 square meters)  Note: GFR calculation is accurate only with a steady state creatinine    CBC and differential [[id number]]  (Abnormal) Collected: 08/15/24 1148    Lab Status: Final result Specimen: Blood from Arm, Right Updated: 08/15/24 1154     WBC 19.54 Thousand/uL      RBC 4.38 Million/uL      Hemoglobin 13.7 g/dL      Hematocrit 40.8 %      MCV 93 fL      MCH 31.3 pg      MCHC 33.6 g/dL      RDW 12.4 %      MPV 9.2 fL      Platelets 212 Thousands/uL      nRBC 0 /100 WBCs      Segmented % 82 %      Immature Grans % 0 %      Lymphocytes % 8 %      Monocytes % 10 %      Eosinophils Relative 0 %      Basophils Relative 0 %      Absolute Neutrophils 15.86 Thousands/µL      Absolute Immature Grans 0.08 Thousand/uL      Absolute Lymphocytes 1.48 Thousands/µL      Absolute Monocytes 1.98 Thousand/µL      Eosinophils Absolute 0.08 Thousand/µL      Basophils Absolute 0.06 Thousands/µL     Mononucleosis screen [[id number]] Collected: 08/15/24 1148    Lab Status: In process Specimen: Blood from Arm, Right Updated: 08/15/24 1152                   CT soft tissue neck   Final Result by Tono Barkley MD (08/15 1320)      Enlarged bilateral palatine tonsils, compatible with tonsillitis. Centrally hypoattenuating, region involving the superior aspect of the right palatine tonsil, measuring up to 10 mm in maximal dimensions, without discrete rim enhancement, indicative of a    right tonsillar  phlegmon/developing abscess.      Multiple enlarged bilateral level 2 cervical lymph nodes, likely reactive.      The study was marked in EPIC for immediate notification.               Workstation performed: [id number]                    Procedures  Procedures         ED Course                                 SBIRT 22yo+      Flowsheet Row Most Recent Value   Initial Alcohol Screen: US AUDIT-C     1. How often do you have a drink containing alcohol? 1 Filed at: 08/15/2024 1126   2. How many drinks containing alcohol do you have on a typical day you are drinking?  0 Filed at: 08/15/2024 1126   3b. FEMALE Any Age, or MALE 65+: How often do you have 4 or more drinks on one occassion? 0 Filed at: 08/15/2024 1126   Audit-C Score 1 Filed at: 08/15/2024 1126   JOLEEN: How many times in the past year have you...    Used an illegal drug or used a prescription medication for non-medical reasons? Never Filed at: 08/15/2024 1126                      Medical Decision Making  Differential diagnosis includes strep pharyngitis, viral pharyngitis, infectious mono, tonsillar abscess.  Patient is nontoxic-appearing.  Labs are reviewed.  She has negative rapid strep.  Mono was sent and is pending.  White count is elevated 19.54. CT soft tissue neck shows following: Enlarged bilateral palatine tonsils, compatible with tonsillitis. Centrally hypoattenuating, region involving the superior aspect of the right palatine tonsil, measuring up to 10 mm in maximal dimensions, without discrete rim enhancement, indicative of a   right tonsillar phlegmon/developing abscess.     Multiple enlarged bilateral level 2 cervical lymph nodes, likely reactive.  I secure chatted ENT Dr Contreras. Plan will be clindamycin with close outpatient follow up early next week.  Patient advised may take Tylenol or ibuprofen (with food) for pain.  She is exhibiting no drooling, trismus, voice change at time of discharge.  She was advised to stay hydrated.  She was  advised to contact ENT office today to schedule appointment for early next week.  Return precautions reviewed.    Amount and/or Complexity of Data Reviewed  Labs: ordered.  Radiology: ordered.    Risk  Prescription drug management.                 Disposition  Final diagnoses:   Tonsillitis     Time reflects when diagnosis was documented in both MDM as applicable and the Disposition within this note       Time User Action Codes Description Comment    8/15/2024  2:00 PM Nguyễn Cardona Add [J03.90] Tonsillitis           ED Disposition       ED Disposition   Discharge    Condition   Stable    Date/Time   Thu Aug 15, 2024 1400    Comment   Sivan Swain discharge to home/self care.                   Follow-up Information       Follow up With Specialties Details Why Contact Info    Kailyn Contreras MD Otolaryngology   [address] [address]  [phone]              Discharge Medication List as of 8/15/2024  2:05 PM        CONTINUE these medications which have CHANGED    Details   clindamycin (CLEOCIN) 300 MG capsule Take 1 capsule (300 mg total) by mouth every 8 (eight) hours for 10 days, Starting Thu 8/15/2024, Until Sun 8/25/2024, Normal           CONTINUE these medications which have NOT CHANGED    Details   acetaminophen (TYLENOL) 325 mg tablet Take 2 tablets by mouth every 4 (four) hours as needed for mild pain, moderate pain, severe pain, headaches or fever, Starting Wed 11/22/2017, No Print      ibuprofen (MOTRIN) 600 mg tablet Take 1 tablet by mouth every 6 (six) hours as needed for mild pain, moderate pain, fever or headaches (cramping), Starting Wed 11/22/2017, No Print      Prenatal Vit-Iron Carbonyl-FA (PRENATAL MULTIVITAMIN) TABS Take 1 tablet by mouth daily., Until Discontinued, Historical Med             No discharge procedures on file.    PDMP Review       None            ED Provider  Electronically Signed by             Nguyễn Cardona PA-C  08/15/24  [id number]